# Patient Record
Sex: FEMALE | Race: WHITE | NOT HISPANIC OR LATINO | Employment: OTHER | URBAN - METROPOLITAN AREA
[De-identification: names, ages, dates, MRNs, and addresses within clinical notes are randomized per-mention and may not be internally consistent; named-entity substitution may affect disease eponyms.]

---

## 2017-07-03 ENCOUNTER — HOSPITAL ENCOUNTER (OUTPATIENT)
Facility: HOSPITAL | Age: 52
Discharge: HOME | End: 2017-07-04
Attending: HOSPITALIST | Admitting: HOSPITALIST
Payer: MEDICARE

## 2017-07-03 DIAGNOSIS — G92.8 DRUG-INDUCED ENCEPHALOPATHY: Primary | ICD-10-CM

## 2017-07-03 DIAGNOSIS — G93.40 ACUTE ENCEPHALOPATHY: ICD-10-CM

## 2017-07-03 PROBLEM — F10.10 ALCOHOL ABUSE: Status: ACTIVE | Noted: 2017-07-03

## 2017-07-03 PROBLEM — F11.20 OPIATE DEPENDENCE: Status: ACTIVE | Noted: 2017-07-03

## 2017-07-03 LAB
CHOLEST/HDLC SERPL: 2.9 {RATIO}
FOLATE SERPL-MCNC: 14.9 NG/ML
HDL/CHOLESTEROL RATIO: 34.4 %
HDLC SERPL-MCNC: 183 MG/DL
HDLC SERPL-MCNC: 63 MG/DL
LDLC SERPL CALC-MCNC: 85.8 MG/DL
NONHDLC SERPL-MCNC: 120 MG/DL
TRIGL SERPL-MCNC: 171 MG/DL
TSH SERPL DL<=0.005 MIU/L-ACNC: 1.74 UIU/ML
VIT B12 SERPL-MCNC: 197 PG/ML

## 2017-07-03 PROCEDURE — G0378 HOSPITAL OBSERVATION PER HR: HCPCS

## 2017-07-03 PROCEDURE — 63600175 PHARM REV CODE 636 W HCPCS: Performed by: HOSPITALIST

## 2017-07-03 PROCEDURE — 86703 HIV-1/HIV-2 1 RESULT ANTBDY: CPT

## 2017-07-03 PROCEDURE — 36415 COLL VENOUS BLD VENIPUNCTURE: CPT

## 2017-07-03 PROCEDURE — 83036 HEMOGLOBIN GLYCOSYLATED A1C: CPT

## 2017-07-03 PROCEDURE — 82607 VITAMIN B-12: CPT

## 2017-07-03 PROCEDURE — G0379 DIRECT REFER HOSPITAL OBSERV: HCPCS

## 2017-07-03 PROCEDURE — 80061 LIPID PANEL: CPT

## 2017-07-03 PROCEDURE — 84443 ASSAY THYROID STIM HORMONE: CPT

## 2017-07-03 PROCEDURE — 86592 SYPHILIS TEST NON-TREP QUAL: CPT

## 2017-07-03 PROCEDURE — 82746 ASSAY OF FOLIC ACID SERUM: CPT

## 2017-07-03 RX ORDER — TRAMADOL HYDROCHLORIDE 50 MG/1
50 TABLET ORAL EVERY 6 HOURS PRN
Status: DISCONTINUED | OUTPATIENT
Start: 2017-07-03 | End: 2017-07-04

## 2017-07-03 RX ORDER — HALOPERIDOL 5 MG/ML
5 INJECTION INTRAMUSCULAR EVERY 8 HOURS PRN
Status: DISCONTINUED | OUTPATIENT
Start: 2017-07-03 | End: 2017-07-04 | Stop reason: HOSPADM

## 2017-07-03 RX ORDER — ENOXAPARIN SODIUM 100 MG/ML
40 INJECTION SUBCUTANEOUS EVERY 24 HOURS
Status: DISCONTINUED | OUTPATIENT
Start: 2017-07-03 | End: 2017-07-04 | Stop reason: HOSPADM

## 2017-07-03 RX ORDER — ACETAMINOPHEN 325 MG/1
650 TABLET ORAL EVERY 6 HOURS PRN
Status: DISCONTINUED | OUTPATIENT
Start: 2017-07-03 | End: 2017-07-04 | Stop reason: HOSPADM

## 2017-07-03 RX ADMIN — ENOXAPARIN SODIUM 40 MG: 100 INJECTION SUBCUTANEOUS at 05:07

## 2017-07-03 NOTE — CONSULTS
[facility]  Cardiac Electrophysiology  Consult Note    Admission Date: 7/3/2017  Code Status: Full Code   Attending Provider: Alberto Duke MD  Consulting Provider: Murtaza Rodriguez MD  Principal Problem:Acute encephalopathy    Inpatient consult to Neurology  Consult performed by: WILLY HERNANDEZ  Consult ordered by: ALBERTO DUKE        Subjective:     Chief Complaint:  AMS    HPI: 52 yo female with pmhx of sciatica and RLS was transferred from [facility] for acute change in mental status. History was obtained from the  and daughter, as patient is not oriented to time and place and does not answer questions appropriately. She has word salad. She has episodes of word salad followed by responses with one words. She is awake and alert and is protecting her airway. Per  she was doing well till 5 pm yesterday. He came home she had dinner ready for them, they talked normally. She has been going through stress at home for her son is in the hospital and sick and the step daughter is chronically sick as well. 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 She was laying down after having few bears with her . Around 6-7 pm she got up and was unstable and had slurred speech.  brought her to the hospital this morning. She had episodes of word salad and would close her eyes and then wake up again agitated but more responsive. She was recognizing her family members, but when asked about what was going on she did not know. She was not able to answer any other questions or follow commands for physical exam. She used to take Norco as long term for 7 years and had recently been taken out of it. She has a history of psych as once before she had overdosed in her medications when her father passed away.  also mentioned that she used to take muscle relaxers but he cannot find her medication bottle.     PMHx:   Sciatica and RLS    Surgery:   Back surgery (family does not know  the details and if she has any screws or metal in the body).     Review of patient's allergies indicates:  Not on File    No current facility-administered medications on file prior to encounter.      No current outpatient prescriptions on file prior to encounter.     Family History     None        Social History Main Topics    Smoking status: Not on file    Smokeless tobacco: Not on file    Alcohol use Not on file    Drug use: Unknown    Sexual activity: Not on file     Review of Systems   Unable to perform ROS: acuity of condition     Objective:     Vital Signs (Most Recent):  Pulse: 90 (07/03/17 1300)  BP: 137/63 (07/03/17 1300) Vital Signs (24h Range):  Pulse:  [90] 90  BP: (137)/(63) 137/63        There is no height or weight on file to calculate BMI.          Physical Exam   Constitutional: She appears well-developed and well-nourished.   Eyes: Right eye exhibits no discharge. Left eye exhibits no discharge. Right conjunctiva is not injected. Left conjunctiva is not injected. Right eye exhibits no nystagmus. Left eye exhibits no nystagmus. Right pupil is not reactive. Left pupil is not reactive.   Neck: Normal range of motion. Neck supple. No thyromegaly present.   Cardiovascular: Normal rate, regular rhythm, normal heart sounds and intact distal pulses.    Pulmonary/Chest: Effort normal and breath sounds normal. No respiratory distress. She has no wheezes. She has no rales. She exhibits no tenderness.   Abdominal: Soft. Bowel sounds are normal. She exhibits no distension and no mass. There is no tenderness. There is no rebound and no guarding.   Neurological: She is disoriented. She displays abnormal reflex. GCS eye subscore is 4. GCS verbal subscore is 2. GCS motor subscore is 4.   Unable to perform good exam as pt is not following commands   Skin: Skin is warm and dry. She is not diaphoretic.   Nursing note and vitals reviewed.        Assessment and Plan:   50 yo female with no significant pmhx was  admitted for AMS.    AMS:   - likely 2/2 stress vs medical overdose vs TIA vs stroke  - Will recommend MRI of the head and MRA of the brain and neck  - Will also recommend following labs: Vit B12, folate, RPR, HIV, TSH, hemoglobin A1c, lipid panel, cbc, cmp, tox screen, ETOH.   - Will recommend EEG    Thank you for your consult. I will follow-up with patient. Please contact us if you have any additional questions.    Teresa Erazo MD  Cardiac Electrophysiology  [facility]

## 2017-07-03 NOTE — SUBJECTIVE & OBJECTIVE
Past Medical History:   Diagnosis Date    Depression     Opiate overdose     Osteoarthritis of spine with radiculopathy, lumbar region        Past Surgical History:   Procedure Laterality Date    SPINE SURGERY  2010    with hardware       Review of patient's allergies indicates:  Not on File    No current facility-administered medications on file prior to encounter.      No current outpatient prescriptions on file prior to encounter.     Family History     Family history is unknown by patient.        Social History Main Topics    Smoking status: Former Smoker    Smokeless tobacco: Not on file    Alcohol use Yes    Drug use: Unknown    Sexual activity: Not on file     Review of Systems   Unable to perform ROS: Mental status change     Objective:     Vital Signs (Most Recent):  Pulse: 90 (07/03/17 1300)  BP: 137/63 (07/03/17 1300) Vital Signs (24h Range):  Pulse:  [90] 90  BP: (137)/(63) 137/63        There is no height or weight on file to calculate BMI.    Physical Exam   Constitutional: She appears well-developed and well-nourished. No distress.   HENT:   Head: Normocephalic and atraumatic.   Eyes: Conjunctivae are normal. Pupils are equal, round, and reactive to light.   Neck: Neck supple. No tracheal deviation present.   Cardiovascular: Normal rate, regular rhythm and normal heart sounds.    Pulmonary/Chest: Effort normal and breath sounds normal. No respiratory distress.   Abdominal: Soft. She exhibits no distension. Bowel sounds are decreased. There is no tenderness.   Musculoskeletal: She exhibits no edema or deformity.   Neurological: She is alert. She displays no atrophy and no tremor. A sensory deficit is present. She displays no seizure activity.   Subjective numbness both sides of face, moves all extremities but not fully cooperative with exam   Skin: Skin is warm and dry.   Psychiatric: Her affect is blunt. Her speech is slurred. She is withdrawn.   Nursing note and vitals reviewed.        Significant Labs: All pertinent labs within the past 24 hours have been reviewed. Labs at [facility] were unremarkable.    Significant Imaging: I have reviewed all pertinent imaging results/findings within the past 24 hours. Head CT at [facility] showed motion artifact but otherwise was unremarkable.

## 2017-07-03 NOTE — ASSESSMENT & PLAN NOTE
Opiate dependence  Unknown what she was taking before but all her medications were stopped by Dr. Ford.  Give PRN acetaminophen and tramadol.

## 2017-07-03 NOTE — PROGRESS NOTES
[facility] Hospital Medicine  Progress Note    Patient Name: Kymberly Mckinley  MRN: [id number]  Patient Class: OP- Observation   Admission Date: 7/3/2017  Length of Stay: 0 days  Attending Physician: Alberto Duke MD  Primary Care Provider: No primary care provider on file.        Subjective:     Principal Problem:Drug-induced encephalopathy    HPI:  Kymberly Mckinley is a 51 y.o. white woman with obesity, lumbar spondylosis with radiculopathy status post spinal surgery, depression, alcohol abuse (drinks about 5 beers a day), opiate dependence with history of overdose, and depression.  She lives in [place], [place].  She is originally from [place].  She is .  Her pain management specialist is Dr. Keenan Ford.    She developed acute confusion, incoherent speech, and generalized muscle weakness around 17:30 on 7/2/17 while with her  at home.  She had been on medications for chronic pain including opiates and pregabalin, but Dr. Ford stopped prescribing her medications and she was nearly out of her medications.  Her  takes opiates and muscle relaxants and could not find his bottle of muscle relaxants.  She was taken to [facility].  She was combative and required soft wrist and ankle restraints.  Drug screen was negative.  Head CT had motion artifact.  Due to lack of Neurology, she was transferred to [facility].     Hospital Course:  No notes on file    Past Medical History:   Diagnosis Date    Depression     Opiate overdose     Osteoarthritis of spine with radiculopathy, lumbar region        Past Surgical History:   Procedure Laterality Date    SPINE SURGERY  2010    with hardware       Review of patient's allergies indicates:  Not on File    No current facility-administered medications on file prior to encounter.      No current outpatient prescriptions on file prior to encounter.     Family History     Family history is  unknown by patient.        Social History Main Topics    Smoking status: Former Smoker    Smokeless tobacco: Not on file    Alcohol use Yes    Drug use: Unknown    Sexual activity: Not on file     Review of Systems   Unable to perform ROS: Mental status change     Objective:     Vital Signs (Most Recent):  Pulse: 90 (07/03/17 1300)  BP: 137/63 (07/03/17 1300) Vital Signs (24h Range):  Pulse:  [90] 90  BP: (137)/(63) 137/63        There is no height or weight on file to calculate BMI.    Physical Exam   Constitutional: She appears well-developed and well-nourished. No distress.   HENT:   Head: Normocephalic and atraumatic.   Eyes: Conjunctivae are normal. Pupils are equal, round, and reactive to light.   Neck: Neck supple. No tracheal deviation present.   Cardiovascular: Normal rate, regular rhythm and normal heart sounds.    Pulmonary/Chest: Effort normal and breath sounds normal. No respiratory distress.   Abdominal: Soft. She exhibits no distension. Bowel sounds are decreased. There is no tenderness.   Musculoskeletal: She exhibits no edema or deformity.   Neurological: She is alert. She displays no atrophy and no tremor. A sensory deficit is present. She displays no seizure activity.   Subjective numbness both sides of face, moves all extremities but not fully cooperative with exam   Skin: Skin is warm and dry.   Psychiatric: Her affect is blunt. Her speech is slurred. She is withdrawn.   Nursing note and vitals reviewed.       Significant Labs: All pertinent labs within the past 24 hours have been reviewed. Labs at [facility] were unremarkable.    Significant Imaging: I have reviewed all pertinent imaging results/findings within the past 24 hours. Head CT at [facility] showed motion artifact but otherwise was unremarkable.     Assessment/Plan:      * Drug-induced encephalopathy    Likely took her 's opiates and/or muscle relaxants, which would have then interacted  with the large amount of beer she normally drinks.  Appreciate Neurology.  For now, will see if mentation improves given time to metabolize alcohol and medications.  Continue neuro checks, fall precautions.          Osteoarthritis of spine with radiculopathy, lumbar region    Opiate dependence  Unknown what she was taking before but all her medications were stopped by Dr. Ford.  Give PRN acetaminophen and tramadol.          Alcohol abuse    Monitor for withdrawal.            VTE Risk Mitigation         Ordered     enoxaparin injection 40 mg  Daily     Route:  Subcutaneous        07/03/17 1207     Medium Risk of VTE  Once      07/03/17 1207        Disposition: If mentation improves with time, likely due to alcohol/drug interactions, and can be discharged home with something to treat her chronic pain.    Alberto Duke MD  Department of Hospital Medicine   [facility]

## 2017-07-03 NOTE — ASSESSMENT & PLAN NOTE
Took her 's baclofen, which would have then interacted with the large amount of beer she normally drinks.  Appreciate Neurology.  No further evaluation needed given her return to baseline.

## 2017-07-03 NOTE — PLAN OF CARE
Problem: Patient Care Overview  Goal: Plan of Care Review  Pt is disoriented but more alert than at time of admit. Pt disoriented to time, situation, and place. Pt reports some back pain which we are relieving by resituating the pt. Restraints are needed because pt is confused and swung at EMS and she is pulling at medical devices. Fall precautions maintained. Will continue to monitor.

## 2017-07-03 NOTE — HPI
Kymberly Mckinley is a 51 y.o. white woman with obesity, lumbar spondylosis with radiculopathy status post spinal surgery, depression, alcohol abuse (drinks about 5 beers a day), opiate dependence with history of overdose, and depression.  She lives in [place], [place].  She is originally from [place].  She is .  Her pain management specialist is Dr. Keenan Ford.    She developed acute confusion, incoherent speech, and generalized muscle weakness around 17:30 on 7/2/17 while with her  at home.  She had been on medications for chronic pain including opiates and pregabalin, but Dr. Ford stopped prescribing her medications and she was nearly out of her medications.  Her son has been in the ICU for a thyroid complication and she had significant stress.  Her  takes opiates and muscle relaxants and could not find his bottle of muscle relaxants.  She was taken to [facility].  She was combative and required soft wrist and ankle restraints.  Drug screen was negative.  Head CT had motion artifact.  Due to lack of Neurology, she was transferred to [facility].

## 2017-07-04 VITALS
HEIGHT: 66 IN | TEMPERATURE: 99 F | HEART RATE: 90 BPM | DIASTOLIC BLOOD PRESSURE: 81 MMHG | SYSTOLIC BLOOD PRESSURE: 135 MMHG | RESPIRATION RATE: 20 BRPM | BODY MASS INDEX: 36.14 KG/M2 | WEIGHT: 224.88 LBS

## 2017-07-04 PROBLEM — F10.10 ALCOHOL ABUSE, DAILY USE: Chronic | Status: ACTIVE | Noted: 2017-07-03

## 2017-07-04 PROBLEM — E53.8 VITAMIN B12 DEFICIENCY: Status: ACTIVE | Noted: 2017-07-04

## 2017-07-04 LAB
ESTIMATED AVG GLUCOSE: 91 MG/DL
HBA1C MFR BLD HPLC: 4.8 %
RPR SER QL: NORMAL

## 2017-07-04 PROCEDURE — G0378 HOSPITAL OBSERVATION PER HR: HCPCS

## 2017-07-04 PROCEDURE — 63600175 PHARM REV CODE 636 W HCPCS: Performed by: HOSPITALIST

## 2017-07-04 RX ORDER — CYANOCOBALAMIN 1000 UG/ML
1000 INJECTION, SOLUTION INTRAMUSCULAR; SUBCUTANEOUS DAILY
Status: DISCONTINUED | OUTPATIENT
Start: 2017-07-04 | End: 2017-07-04 | Stop reason: HOSPADM

## 2017-07-04 RX ORDER — CYANOCOBALAMIN 1000 UG/ML
INJECTION, SOLUTION INTRAMUSCULAR; SUBCUTANEOUS
Qty: 10 ML | Refills: 3 | Status: SHIPPED | OUTPATIENT
Start: 2017-07-04 | End: 2022-05-13

## 2017-07-04 RX ADMIN — CYANOCOBALAMIN 1000 MCG: 1000 INJECTION, SOLUTION INTRAMUSCULAR; SUBCUTANEOUS at 09:07

## 2017-07-04 NOTE — HOSPITAL COURSE
Muscle relaxant overdose worsened by her daily alcohol abuse was suspected.  Neurology recommended extensive workup.  She was found to have vitamin B12 deficiency.  The next morning her mentation improved and she confirmed the suspicion by reporting that she had taken the bottle of baclofen that her  could not find, and had taken several baclofen.        Daily Note:  Back to baseline today per . Does not want to undergo any MRIs or EEGs.

## 2017-07-04 NOTE — PLAN OF CARE
Problem: Patient Care Overview  Goal: Plan of Care Review  Outcome: Ongoing (interventions implemented as appropriate)  Pt stable. NO distress noted. POC reviewed with pt. Pt verbalized understanding. Pt remains SR on the monitor. NO true red alarms noted. Pt c/o back and vaginal pain. Rosales removed and warm compress applied to lower back. Soft non violent restraints in place to bilateral wrist. Fall precautions maintained. Bed in lowest position, call light in reach and bed alarm on.

## 2017-07-04 NOTE — H&P
[facility] Hospital Medicine  History & Physical    Patient Name: Kymberly Mckinley  MRN: [id number]  Admission Date: 7/3/2017  Attending Physician: Alberto Duke MD   Primary Care Provider: No primary care provider on file.         Patient information was obtained from spouse/SO and relative(s).     Subjective:     Principal Problem:Drug-induced encephalopathy    Chief Complaint: Transfer from [facility] for Neurology     HPI: Kymberly Mckinley is a 51 y.o. white woman with obesity, lumbar spondylosis with radiculopathy status post spinal surgery, depression, alcohol abuse (drinks about 5 beers a day), opiate dependence with history of overdose, and depression.  She lives in [place], [place].  She is originally from [place].  She is .  Her pain management specialist is Dr. Keenan Ford.    She developed acute confusion, incoherent speech, and generalized muscle weakness around 17:30 on 7/2/17 while with her  at home.  She had been on medications for chronic pain including opiates and pregabalin, but Dr. Ford stopped prescribing her medications and she was nearly out of her medications.  Her son has been in the ICU for a thyroid complication and she had under stress.  Her  takes opiates and muscle relaxants and could not find his bottle of muscle relaxants.  She was taken to [facility].  She was combative and required soft wrist and ankle restraints.  Drug screen was negative.  Head CT had motion artifact.  Due to lack of Neurology, she was transferred to [facility].     Past Medical History:   Diagnosis Date    Depression     Opiate overdose     Osteoarthritis of spine with radiculopathy, lumbar region        Past Surgical History:   Procedure Laterality Date    SPINE SURGERY  2010    with hardware       Review of patient's allergies indicates:  Not on File    No current facility-administered medications on  file prior to encounter.      No current outpatient prescriptions on file prior to encounter.     Family History     Family history is unknown by patient.        Social History Main Topics    Smoking status: Former Smoker    Smokeless tobacco: Not on file    Alcohol use Yes    Drug use: Unknown    Sexual activity: Not on file     Review of Systems   Unable to perform ROS: Mental status change     Objective:     Vital Signs (Most Recent):  Temp: 98.2 °F (36.8 °C) (07/03/17 1300)  Pulse: 90 (07/03/17 1300)  Resp: 18 (07/03/17 1300)  BP: 137/63 (07/03/17 1300) Vital Signs (24h Range):  Temp:  [98.2 °F (36.8 °C)] 98.2 °F (36.8 °C)  Pulse:  [90] 90  Resp:  [18] 18  BP: (137)/(63) 137/63     Weight: 102 kg (224 lb 13.9 oz)  Body mass index is 36.29 kg/m².    Physical Exam   Constitutional: She appears well-developed and well-nourished. No distress.   HENT:   Head: Normocephalic and atraumatic.   Eyes: Conjunctivae are normal. Pupils are equal, round, and reactive to light.   Neck: Neck supple. No tracheal deviation present.   Cardiovascular: Normal rate, regular rhythm and normal heart sounds.    Pulmonary/Chest: Effort normal and breath sounds normal. No respiratory distress.   Abdominal: Soft. She exhibits no distension. Bowel sounds are decreased. There is no tenderness.   Musculoskeletal: She exhibits no edema or deformity.   Neurological: She is alert. She displays no atrophy and no tremor. A sensory deficit is present. She displays no seizure activity.   Subjective numbness both sides of face, moves all extremities but not fully cooperative with exam   Skin: Skin is warm and dry.   Psychiatric: Her affect is blunt. Her speech is slurred. She is withdrawn.   Nursing note and vitals reviewed.       Significant Labs: All pertinent labs within the past 24 hours have been reviewed. Labs at [facility] were unremarkable.    Significant Imaging: I have reviewed all pertinent imaging results/findings  within the past 24 hours. Head CT at [facility] showed motion artifact but otherwise was unremarkable.     Assessment/Plan:     * Drug-induced encephalopathy    Likely took her 's opiates and/or muscle relaxants, which would have then interacted with the large amount of beer she normally drinks.  Appreciate Neurology.  For now, will see if mentation improves given time to metabolize alcohol and medications.  Continue neuro checks, fall precautions.          Osteoarthritis of spine with radiculopathy, lumbar region    Opiate dependence  Unknown what she was taking before but all her medications were stopped by Dr. Ford.  Give PRN acetaminophen and tramadol.          Alcohol abuse    Monitor for withdrawal.            VTE Risk Mitigation         Ordered     enoxaparin injection 40 mg  Daily     Route:  Subcutaneous        07/03/17 1207     Medium Risk of VTE  Once      07/03/17 1207        Alberto Duke MD  Department of Hospital Medicine   [facility]

## 2017-07-04 NOTE — SUBJECTIVE & OBJECTIVE
Past Medical History:   Diagnosis Date    Depression     Opiate overdose     Osteoarthritis of spine with radiculopathy, lumbar region        Past Surgical History:   Procedure Laterality Date    SPINE SURGERY  2010    with hardware       Review of patient's allergies indicates:  Not on File    No current facility-administered medications on file prior to encounter.      No current outpatient prescriptions on file prior to encounter.     Family History     Family history is unknown by patient.        Social History Main Topics    Smoking status: Former Smoker    Smokeless tobacco: Not on file    Alcohol use Yes    Drug use: Unknown    Sexual activity: Not on file     Review of Systems   Unable to perform ROS: Mental status change     Objective:     Vital Signs (Most Recent):  Temp: 98.2 °F (36.8 °C) (07/03/17 1300)  Pulse: 90 (07/03/17 1300)  Resp: 18 (07/03/17 1300)  BP: 137/63 (07/03/17 1300) Vital Signs (24h Range):  Temp:  [98.2 °F (36.8 °C)] 98.2 °F (36.8 °C)  Pulse:  [90] 90  Resp:  [18] 18  BP: (137)/(63) 137/63     Weight: 102 kg (224 lb 13.9 oz)  Body mass index is 36.29 kg/m².    Physical Exam   Constitutional: She appears well-developed and well-nourished. No distress.   HENT:   Head: Normocephalic and atraumatic.   Eyes: Conjunctivae are normal. Pupils are equal, round, and reactive to light.   Neck: Neck supple. No tracheal deviation present.   Cardiovascular: Normal rate, regular rhythm and normal heart sounds.    Pulmonary/Chest: Effort normal and breath sounds normal. No respiratory distress.   Abdominal: Soft. She exhibits no distension. Bowel sounds are decreased. There is no tenderness.   Musculoskeletal: She exhibits no edema or deformity.   Neurological: She is alert. She displays no atrophy and no tremor. A sensory deficit is present. She displays no seizure activity.   Subjective numbness both sides of face, moves all extremities but not fully cooperative with exam   Skin: Skin is  warm and dry.   Psychiatric: Her affect is blunt. Her speech is slurred. She is withdrawn.   Nursing note and vitals reviewed.       Significant Labs: All pertinent labs within the past 24 hours have been reviewed. Labs at [facility] were unremarkable.    Significant Imaging: I have reviewed all pertinent imaging results/findings within the past 24 hours. Head CT at [facility] showed motion artifact but otherwise was unremarkable.

## 2017-07-04 NOTE — PLAN OF CARE
Family was assured on admission that patient probably just took the muscle relaxants that her  could not find.  She confirmed this suspicion this morning and overdose is resolved.  Cancel the rest of Neurology's recommended stroke workup.  Patient will be discharged home.

## 2017-07-04 NOTE — PROGRESS NOTES
"[facility] Progress Note    Subjective  Patient is awake, alert, and oriented to person, place, time, and situation    Patient's mental status improved greatly today versus yesterday.  Patient reports that she remembers several details about the events of the day she was brought in to the hospital, and recounted these details to the team.      Patient at first denied having taken any medication preceding her admission except for beer, but patient subsequently recounted that, because her  typically only gives her one baclofen per day, she found the baclofen bottle and hid it from him and took "three" baclofen that day in addition to the beer she drank.    Past Medical History -   Past Medical History:   Diagnosis Date    Depression     Opiate overdose     Osteoarthritis of spine with radiculopathy, lumbar region        Social History -   Social History     Social History    Marital status:      Spouse name: N/A    Number of children: N/A    Years of education: N/A     Social History Main Topics    Smoking status: Former Smoker    Smokeless tobacco: None    Alcohol use Yes    Drug use: Unknown    Sexual activity: Not Asked     Other Topics Concern    None     Social History Narrative    None       Family History -   Family History   Problem Relation Age of Onset    Family history unknown: Yes       Allergies - NKDA  Medications -   No current facility-administered medications on file prior to encounter.      No current outpatient prescriptions on file prior to encounter.         Vitals -     Vitals:    07/04/17 0305 07/04/17 0440 07/04/17 0505 07/04/17 0700   BP:  137/63  (!) 142/61   BP Location:  Left arm  Left arm   Patient Position:  Lying  Lying   BP Method:  Automatic  Automatic   Pulse: 86 85 78 80   Resp:  20  20   Temp:  98.3 °F (36.8 °C)  98.4 °F (36.9 °C)   TempSrc:  Oral  Oral   Weight:       Height:           Neurological Exam -     General appearance: Well nourished, well " developed, no acute distress.  Facial Expression: normal       Affect: full       Orientation to time & place:  Oriented to time, place, person and situation       Attention & concentration:  Normal attention span and concentration       Memory:  Recent and remote memory intact  Language: Spontaneous, fluent; able to repeat and name objects        Fund of knowledge:  Aware of current events        Speech:  normal (not dysarthric)    Cranial nerves: normal visual acuity, visual fields full, pupils equal round and reactive, extraocular movements intact, facial sensation intact, face symmetrical, hearing intact to whisper, palate raises midline, shoulder shrug strength normal, tongue protrudes midline.    Musculoskeletal:  Muscle tone: all 4 extremities normal        Muscle Bulk: all 4 extremities normal        Muscle strength:  5/5 in bilateral upper and lower extremities in proximal and distal flexion and extension       No pronator drift  Sensation: Intact to light touch, pin prick, vibration in all extremities         Deep tendon Reflexes: 2+ bilateral triceps, biceps, and brachioradialis; 2+ in bilateral patellae. Positive ankle jerks.  Plantar flexor responses bilaterally    Cerebellar and Coordination:  Gait:  deferred     Finger-nose: no dysmetria       Rapid Alternating Movements (pronation/supination):  R normal; L normal    Labs -      Recent Labs  Lab 07/03/17  1940   TSH 1.736   [id number] 197*   FOLATE 14.9         Assessment and Plan -   51 year old woman who presented for altered mental status  -After the patient's recount of the events that brought her into the hospital coupled with her physical exam yesterday and rapid improvement after the initial evaluation and then today, it seems nearly certain that this presentation was 2/2 medication ingestion.  -Patient significantly improved today; awake, alert, and oriented    We will sign off for now.  Thank you for the consult.  Please contact us if we can  be of further assistance.    Discussed with Dr. Jennifer Israel MD  [facility]

## 2017-07-05 LAB — HIV 1+2 AB+HIV1 P24 AG SERPL QL IA: NEGATIVE

## 2022-01-14 NOTE — PLAN OF CARE
D/c orders received, pt has no HH or DME needs.   Pt AA0x 3, independent with adl's.     Scar Spouse: [phone] available to  patient in discharge.           ED SIGNOUT  Date/Time:1/14/2022 12:01 AM    Patient signed out to me by my colleague, Dr. Saunders.  Please see their note for complete history and physical. Plan to follow up on labs and CT.      The creation of this record is based on the scribe s observations of the work being performed by Dr. Simon and the provider s statements to them. It was created on their behalf by Cesar Emerson a trained medical scribe. This document has been checked and approved by the attending provider.      REMAINING ED WORKUP:    Brief HPI:  Patient reports he has been recovering from COVID-19 recently and has continued to have a cough and fever despite testing negative. Patient also had onset of worsening epigastric pain a few days ago which he states is consistent with his PSC. He was seen by his PCP earlier today and was referred to this ED after his WBC was elevated and he measured a fever. Patient notes that he did have ERCP done for his PSC at [facility] in October of last year.    Vitals:  BP (!) 146/86   Pulse 99   Temp 99.6  F (37.6  C) (Oral)   Resp 23   Wt 100.7 kg (222 lb)   SpO2 97%   BMI 28.50 kg/m        Pertinent labs results reviewed   Results for orders placed or performed during the hospital encounter of 01/13/22   CT Abdomen Pelvis w Contrast    Impression    IMPRESSION:   1.  Changes in the liver and bile ducts compatible with patient's known PSC as detailed above.    2.  Indeterminate poorly marginated prominent geographic area of heterogeneous decreased attenuation in the left hepatic lobe superiorly. This is new compared to prior study and indeterminate. Given the patient's history of PSC, an underlying   infiltrative neoplasm cannot be excluded. However, benign etiologies would also be in the differential including some heterogeneous asymmetric fatty infiltration or infection. Given these findings and the patient's history, MRI of the liver is   recommended for further evaluation.    3.  A few mildly prominent  emmanuelle hepatis lymph nodes are nonspecific and unchanged. There are however a couple indeterminate lymph nodes in the lower anterior mediastinum which have shown some increase in size over time.    4.  Postoperative changes of colectomy with ileoanal anastomosis. No evidence for bowel obstruction or acute bowel findings.   Extra Blue Top Tube   Result Value Ref Range    Hold Specimen JIC    Extra Red Top Tube   Result Value Ref Range    Hold Specimen JIC    Extra Green Top (Lithium Heparin) Tube   Result Value Ref Range    Hold Specimen JIC    Extra Purple Top Tube   Result Value Ref Range    Hold Specimen JIC    Extra Green Top (Lithium Heparin) ON ICE   Result Value Ref Range    Hold Specimen JIC    Comprehensive metabolic panel   Result Value Ref Range    Sodium 139 136 - 145 mmol/L    Potassium 3.7 3.5 - 5.0 mmol/L    Chloride 104 98 - 107 mmol/L    Carbon Dioxide (CO2) 25 22 - 31 mmol/L    Anion Gap 10 5 - 18 mmol/L    Urea Nitrogen 7 (L) 8 - 22 mg/dL    Creatinine 0.82 0.70 - 1.30 mg/dL    Calcium 9.8 8.5 - 10.5 mg/dL    Glucose 134 (H) 70 - 125 mg/dL    Alkaline Phosphatase 170 (H) 45 - 120 U/L    AST 27 0 - 40 U/L    ALT 34 0 - 45 U/L    Protein Total 8.9 (H) 6.0 - 8.0 g/dL    Albumin 3.5 3.5 - 5.0 g/dL    Bilirubin Total 0.5 0.0 - 1.0 mg/dL    GFR Estimate >90 >60 mL/min/1.73m2   CBC with platelets and differential   Result Value Ref Range    WBC Count 13.6 (H) 4.0 - 11.0 10e3/uL    RBC Count 5.47 4.40 - 5.90 10e6/uL    Hemoglobin 15.6 13.3 - 17.7 g/dL    Hematocrit 46.9 40.0 - 53.0 %    MCV 86 78 - 100 fL    MCH 28.5 26.5 - 33.0 pg    MCHC 33.3 31.5 - 36.5 g/dL    RDW 12.0 10.0 - 15.0 %    Platelet Count 477 (H) 150 - 450 10e3/uL    % Neutrophils 60 %    % Lymphocytes 28 %    % Monocytes 10 %    % Eosinophils 1 %    % Basophils 1 %    % Immature Granulocytes 0 %    NRBCs per 100 WBC 0 <1 /100    Absolute Neutrophils 8.3 1.6 - 8.3 10e3/uL    Absolute Lymphocytes 3.8 0.8 - 5.3 10e3/uL    Absolute Monocytes  1.3 0.0 - 1.3 10e3/uL    Absolute Eosinophils 0.1 0.0 - 0.7 10e3/uL    Absolute Basophils 0.1 0.0 - 0.2 10e3/uL    Absolute Immature Granulocytes 0.0 <=0.4 10e3/uL    Absolute NRBCs 0.0 10e3/uL   Result Value Ref Range    Lipase 30 0 - 52 U/L   Symptomatic; Unknown Influenza A/B & SARS-CoV2 (COVID-19) Virus PCR Multiplex Nasopharyngeal    Specimen: Nasopharyngeal; Swab   Result Value Ref Range    Influenza A PCR Negative Negative    Influenza B PCR Negative Negative    SARS CoV2 PCR Positive (A) Negative   Troponin I (now)   Result Value Ref Range    Troponin I <0.01 0.00 - 0.29 ng/mL       Pertinent imaging reviewed   Please see official radiology report.  CT Abdomen Pelvis w Contrast   Final Result   IMPRESSION:    1.  Changes in the liver and bile ducts compatible with patient's known PSC as detailed above.      2.  Indeterminate poorly marginated prominent geographic area of heterogeneous decreased attenuation in the left hepatic lobe superiorly. This is new compared to prior study and indeterminate. Given the patient's history of PSC, an underlying    infiltrative neoplasm cannot be excluded. However, benign etiologies would also be in the differential including some heterogeneous asymmetric fatty infiltration or infection. Given these findings and the patient's history, MRI of the liver is    recommended for further evaluation.      3.  A few mildly prominent emmanuelle hepatis lymph nodes are nonspecific and unchanged. There are however a couple indeterminate lymph nodes in the lower anterior mediastinum which have shown some increase in size over time.      4.  Postoperative changes of colectomy with ileoanal anastomosis. No evidence for bowel obstruction or acute bowel findings.           Interventions  Medications   ketorolac (TORADOL) injection 15 mg (has no administration in time range)   iopamidol (ISOVUE-370) solution 100 mL (100 mLs Intravenous Given 1/13/22 7335)        ED Course/MDM:  12:01 AM Signout  accepted from Dr. Saunders.  Prior records were reviewed.  Diagnostics from this visit are reviewed.  12:59 AM  I introduced myself to the patient.    CT scan does not show any signs of biliary obstruction. Bilirubin is normal. White count slightly elevated. There is an area of low-attenuation in the left hepatic lobe of unclear significance. I do not think this is because of his symptoms but does need follow-up as he has had a high risk of hepatic cancer in the setting of PSC. This was discussed with the patient. He will follow-up with his gastroenterologist to get MRI in the future. I think his symptoms are due to COVID. No signs of cardiac disease. Given IVToradol with improvement of his symptoms. Patient will return for any worsening symptoms. Discharged home. Oxygen saturation normal.    ED Course as of 01/14/22 0100   Fri Jan 14, 2022   0057 CT Abdomen Pelvis w Contrast           1. Epigastric pain    2. Infection due to 2019 novel coronavirus    3. PSC (primary sclerosing cholangitis)          Dr. Simon  [facility] Emergency Department   January 14, 2022        Ferdinand Simon MD  01/14/22 0100

## 2022-05-13 ENCOUNTER — LAB VISIT (OUTPATIENT)
Dept: LAB | Facility: HOSPITAL | Age: 57
End: 2022-05-13
Attending: INTERNAL MEDICINE
Payer: MEDICARE

## 2022-05-13 DIAGNOSIS — E55.9 VITAMIN D DEFICIENCY: ICD-10-CM

## 2022-05-13 DIAGNOSIS — E53.8 VITAMIN B12 DEFICIENCY: ICD-10-CM

## 2022-05-13 DIAGNOSIS — R53.82 CHRONIC FATIGUE, UNSPECIFIED: ICD-10-CM

## 2022-05-13 DIAGNOSIS — M47.26 OSTEOARTHRITIS OF SPINE WITH RADICULOPATHY, LUMBAR REGION: ICD-10-CM

## 2022-05-13 DIAGNOSIS — Z11.59 NEED FOR HEPATITIS C SCREENING TEST: ICD-10-CM

## 2022-05-13 DIAGNOSIS — K12.1 MOUTH ULCERS: ICD-10-CM

## 2022-05-13 DIAGNOSIS — E78.5 HYPERLIPIDEMIA, UNSPECIFIED HYPERLIPIDEMIA TYPE: ICD-10-CM

## 2022-05-13 DIAGNOSIS — R73.9 HYPERGLYCEMIA: ICD-10-CM

## 2022-05-13 PROBLEM — M62.838 MUSCLE SPASM: Status: ACTIVE | Noted: 2022-05-13

## 2022-05-13 PROBLEM — G47.00 INSOMNIA: Status: ACTIVE | Noted: 2022-05-13

## 2022-05-13 PROBLEM — G89.29 CHRONIC PAIN: Status: ACTIVE | Noted: 2022-05-13

## 2022-05-13 PROBLEM — Z00.00 WELLNESS EXAMINATION: Status: ACTIVE | Noted: 2022-05-13

## 2022-05-13 LAB
25(OH)D3+25(OH)D2 SERPL-MCNC: 19 NG/ML (ref 30–96)
ALBUMIN SERPL BCP-MCNC: 4.1 G/DL (ref 3.5–5.2)
ALP SERPL-CCNC: 82 U/L (ref 55–135)
ALT SERPL W/O P-5'-P-CCNC: 29 U/L (ref 10–44)
ANION GAP SERPL CALC-SCNC: 8 MMOL/L (ref 8–16)
AST SERPL-CCNC: 28 U/L (ref 10–40)
BASOPHILS # BLD AUTO: 0.04 K/UL (ref 0–0.2)
BASOPHILS NFR BLD: 0.9 % (ref 0–1.9)
BILIRUB SERPL-MCNC: 0.3 MG/DL (ref 0.1–1)
BUN SERPL-MCNC: 24 MG/DL (ref 6–20)
CALCIUM SERPL-MCNC: 8.9 MG/DL (ref 8.7–10.5)
CHLORIDE SERPL-SCNC: 109 MMOL/L (ref 95–110)
CHOLEST SERPL-MCNC: 230 MG/DL (ref 120–199)
CHOLEST/HDLC SERPL: 5 {RATIO} (ref 2–5)
CO2 SERPL-SCNC: 23 MMOL/L (ref 23–29)
CREAT SERPL-MCNC: 0.5 MG/DL (ref 0.5–1.4)
CRP SERPL-MCNC: 1.23 MG/DL (ref 0–0.75)
DIFFERENTIAL METHOD: ABNORMAL
EOSINOPHIL # BLD AUTO: 0.1 K/UL (ref 0–0.5)
EOSINOPHIL NFR BLD: 2.6 % (ref 0–8)
ERYTHROCYTE [DISTWIDTH] IN BLOOD BY AUTOMATED COUNT: 14.5 % (ref 11.5–14.5)
ERYTHROCYTE [SEDIMENTATION RATE] IN BLOOD: 8 MM/HR (ref 0–20)
EST. GFR  (AFRICAN AMERICAN): >60 ML/MIN/1.73 M^2
EST. GFR  (NON AFRICAN AMERICAN): >60 ML/MIN/1.73 M^2
ESTIMATED AVG GLUCOSE: 105 MG/DL (ref 68–131)
GLUCOSE SERPL-MCNC: 93 MG/DL (ref 70–110)
HBA1C MFR BLD: 5.3 % (ref 4–5.6)
HCT VFR BLD AUTO: 37.3 % (ref 37–48.5)
HDLC SERPL-MCNC: 46 MG/DL (ref 40–75)
HDLC SERPL: 20 % (ref 20–50)
HGB BLD-MCNC: 12.7 G/DL (ref 12–16)
IMM GRANULOCYTES # BLD AUTO: 0.01 K/UL (ref 0–0.04)
IMM GRANULOCYTES NFR BLD AUTO: 0.2 % (ref 0–0.5)
LDLC SERPL CALC-MCNC: 111.8 MG/DL (ref 63–159)
LYMPHOCYTES # BLD AUTO: 1.3 K/UL (ref 1–4.8)
LYMPHOCYTES NFR BLD: 28.5 % (ref 18–48)
MCH RBC QN AUTO: 30.5 PG (ref 27–31)
MCHC RBC AUTO-ENTMCNC: 34 G/DL (ref 32–36)
MCV RBC AUTO: 89 FL (ref 82–98)
MONOCYTES # BLD AUTO: 0.4 K/UL (ref 0.3–1)
MONOCYTES NFR BLD: 9.1 % (ref 4–15)
NEUTROPHILS # BLD AUTO: 2.8 K/UL (ref 1.8–7.7)
NEUTROPHILS NFR BLD: 58.7 % (ref 38–73)
NONHDLC SERPL-MCNC: 184 MG/DL
NRBC BLD-RTO: 0 /100 WBC
PLATELET # BLD AUTO: 268 K/UL (ref 150–450)
PMV BLD AUTO: 8.9 FL (ref 9.2–12.9)
POTASSIUM SERPL-SCNC: 4.2 MMOL/L (ref 3.5–5.1)
PROT SERPL-MCNC: 7.7 G/DL (ref 6–8.4)
RBC # BLD AUTO: 4.17 M/UL (ref 4–5.4)
RHEUMATOID FACT SERPL-ACNC: <10 IU/ML (ref 0–15)
SODIUM SERPL-SCNC: 140 MMOL/L (ref 136–145)
TRIGL SERPL-MCNC: 361 MG/DL (ref 30–150)
TSH SERPL DL<=0.005 MIU/L-ACNC: 3.07 UIU/ML (ref 0.4–4)
VIT B12 SERPL-MCNC: 528 PG/ML (ref 210–950)
WBC # BLD AUTO: 4.7 K/UL (ref 3.9–12.7)

## 2022-05-13 PROCEDURE — 84443 ASSAY THYROID STIM HORMONE: CPT | Performed by: INTERNAL MEDICINE

## 2022-05-13 PROCEDURE — 83036 HEMOGLOBIN GLYCOSYLATED A1C: CPT | Performed by: INTERNAL MEDICINE

## 2022-05-13 PROCEDURE — 86039 ANTINUCLEAR ANTIBODIES (ANA): CPT | Performed by: INTERNAL MEDICINE

## 2022-05-13 PROCEDURE — 85025 COMPLETE CBC W/AUTO DIFF WBC: CPT | Performed by: INTERNAL MEDICINE

## 2022-05-13 PROCEDURE — 86431 RHEUMATOID FACTOR QUANT: CPT | Performed by: INTERNAL MEDICINE

## 2022-05-13 PROCEDURE — 80074 ACUTE HEPATITIS PANEL: CPT | Performed by: INTERNAL MEDICINE

## 2022-05-13 PROCEDURE — 80061 LIPID PANEL: CPT | Performed by: INTERNAL MEDICINE

## 2022-05-13 PROCEDURE — 86671 FUNGUS NES ANTIBODY: CPT | Mod: 91 | Performed by: INTERNAL MEDICINE

## 2022-05-13 PROCEDURE — 36415 COLL VENOUS BLD VENIPUNCTURE: CPT | Performed by: INTERNAL MEDICINE

## 2022-05-13 PROCEDURE — 86038 ANTINUCLEAR ANTIBODIES: CPT | Performed by: INTERNAL MEDICINE

## 2022-05-13 PROCEDURE — 87389 HIV-1 AG W/HIV-1&-2 AB AG IA: CPT | Performed by: INTERNAL MEDICINE

## 2022-05-13 PROCEDURE — 86140 C-REACTIVE PROTEIN: CPT | Performed by: INTERNAL MEDICINE

## 2022-05-13 PROCEDURE — 85651 RBC SED RATE NONAUTOMATED: CPT | Performed by: INTERNAL MEDICINE

## 2022-05-13 PROCEDURE — 82306 VITAMIN D 25 HYDROXY: CPT | Performed by: INTERNAL MEDICINE

## 2022-05-13 PROCEDURE — 82607 VITAMIN B-12: CPT | Performed by: INTERNAL MEDICINE

## 2022-05-13 PROCEDURE — 80053 COMPREHEN METABOLIC PANEL: CPT | Performed by: INTERNAL MEDICINE

## 2022-05-16 LAB
ANA PATTERN 1: NORMAL
ANA SER-ACNC: POSITIVE
ANA TITR SER IF: NORMAL {TITER}

## 2022-05-17 LAB
HAV IGM SERPL QL IA: NEGATIVE
HBV CORE IGM SERPL QL IA: NEGATIVE
HBV SURFACE AG SERPL QL IA: NEGATIVE
HCV AB SERPL QL IA: NEGATIVE
HIV 1+2 AB+HIV1 P24 AG SERPL QL IA: NEGATIVE

## 2022-05-18 LAB
ANCA AB PATTERN SER IF-IMP: NORMAL
ANCA IGG TITR SER IF: NORMAL {TITER}
BAKER'S YEAST IGA QN IA: 5.9 UNITS (ref 0–24.9)
BAKER'S YEAST IGG QN IA: 17.6 UNITS (ref 0–24.9)
PATHOLOGY STUDY: NORMAL
TEST PERFORMANCE INFO SPEC: NORMAL

## 2022-08-15 PROBLEM — Z00.00 WELLNESS EXAMINATION: Status: RESOLVED | Noted: 2022-05-13 | Resolved: 2022-08-15

## 2023-03-08 ENCOUNTER — HOSPITAL ENCOUNTER (OUTPATIENT)
Dept: RADIOLOGY | Facility: HOSPITAL | Age: 58
Discharge: HOME | End: 2023-03-08
Attending: INTERNAL MEDICINE
Payer: MEDICARE

## 2023-03-08 VITALS — BODY MASS INDEX: 37.61 KG/M2 | WEIGHT: 234 LBS | HEIGHT: 66 IN

## 2023-03-08 DIAGNOSIS — Z12.31 SCREENING MAMMOGRAM FOR BREAST CANCER: ICD-10-CM

## 2023-03-08 DIAGNOSIS — Z78.0 POSTMENOPAUSAL: ICD-10-CM

## 2023-03-08 PROCEDURE — 77067 SCR MAMMO BI INCL CAD: CPT | Mod: TC

## 2023-03-08 PROCEDURE — 77080 DXA BONE DENSITY AXIAL: CPT | Mod: TC

## 2023-11-24 RX ORDER — CIPROFLOXACIN 500 MG/1
500 TABLET ORAL EVERY 12 HOURS
Qty: 10 TABLET | Refills: 0 | Status: SHIPPED | OUTPATIENT
Start: 2023-11-24 | End: 2023-11-29

## 2023-12-12 ENCOUNTER — APPOINTMENT (OUTPATIENT)
Dept: LAB | Facility: HOSPITAL | Age: 58
End: 2023-12-12
Payer: MEDICARE

## 2023-12-12 PROBLEM — J06.9 UPPER RESPIRATORY TRACT INFECTION: Status: ACTIVE | Noted: 2023-12-12

## 2023-12-12 PROBLEM — N30.00 ACUTE CYSTITIS WITHOUT HEMATURIA: Status: ACTIVE | Noted: 2023-12-12

## 2024-12-06 ENCOUNTER — HOSPITAL ENCOUNTER (EMERGENCY)
Facility: HOSPITAL | Age: 59
Discharge: HOME | End: 2024-12-06
Attending: INTERNAL MEDICINE
Payer: MEDICARE

## 2024-12-06 VITALS
HEART RATE: 92 BPM | SYSTOLIC BLOOD PRESSURE: 154 MMHG | WEIGHT: 190 LBS | RESPIRATION RATE: 18 BRPM | DIASTOLIC BLOOD PRESSURE: 71 MMHG | TEMPERATURE: 98 F | OXYGEN SATURATION: 97 % | BODY MASS INDEX: 30.53 KG/M2 | HEIGHT: 66 IN

## 2024-12-06 DIAGNOSIS — M94.0 COSTOCHONDRITIS, ACUTE: Primary | ICD-10-CM

## 2024-12-06 PROCEDURE — 96372 THER/PROPH/DIAG INJ SC/IM: CPT | Performed by: NURSE PRACTITIONER

## 2024-12-06 PROCEDURE — 63600175 PHARM REV CODE 636 W HCPCS: Performed by: NURSE PRACTITIONER

## 2024-12-06 PROCEDURE — 99284 EMERGENCY DEPT VISIT MOD MDM: CPT | Mod: 25

## 2024-12-06 RX ORDER — PREDNISONE 20 MG/1
20 TABLET ORAL DAILY
Qty: 5 TABLET | Refills: 0 | Status: SHIPPED | OUTPATIENT
Start: 2024-12-06 | End: 2024-12-11

## 2024-12-06 RX ORDER — DEXAMETHASONE SODIUM PHOSPHATE 4 MG/ML
4 INJECTION, SOLUTION INTRA-ARTICULAR; INTRALESIONAL; INTRAMUSCULAR; INTRAVENOUS; SOFT TISSUE
Status: COMPLETED | OUTPATIENT
Start: 2024-12-06 | End: 2024-12-06

## 2024-12-06 RX ORDER — CYCLOBENZAPRINE HCL 10 MG
10 TABLET ORAL EVERY 8 HOURS PRN
Qty: 12 TABLET | Refills: 0 | Status: SHIPPED | OUTPATIENT
Start: 2024-12-06 | End: 2024-12-10

## 2024-12-06 RX ADMIN — DEXAMETHASONE SODIUM PHOSPHATE 4 MG: 4 INJECTION, SOLUTION INTRA-ARTICULAR; INTRALESIONAL; INTRAMUSCULAR; INTRAVENOUS; SOFT TISSUE at 03:12

## 2024-12-06 NOTE — DISCHARGE INSTRUCTIONS
Use medication as directed.  Avoid heavy lifting and exertional activity, avoid prolonged inactivity.  Plan to follow-up with your primary provider on Monday and return to the emergency department for worsening condition.

## 2024-12-06 NOTE — ED PROVIDER NOTES
Encounter Date: 12/6/2024       History     Chief Complaint   Patient presents with    Rib Injury     Complains of right rib pain x 2 days after falling out of bed. Pain is worse with movement.      This is a 59-year-old white female with multiple medical problems including chronic pain, movement/extrapyramidal movements disorder, and frequent falls who presents to the emergency department with complaints right-sided rib pain which she attributes to a fall she sustained 2 days ago.  Patient reports rolling out of bed and striking right ribcage on door frame.  She reports that since that time she has had intermittent sharp pains, exacerbated by deep breaths and certain movements.  She denies cough, shortness of breath, or any other relative symptoms at this time.  Patient admits to frequent falls.      Review of patient's allergies indicates:   Allergen Reactions    Tizanidine      Other reaction(s): Nausea    Tramadol Hives     Past Medical History:   Diagnosis Date    Chronic pain     Depression     Insomnia     Muscle relaxant overdose 07/03/2017    baclofen    Opiate overdose     Osteoarthritis of spine with radiculopathy, lumbar region     Osteopenia     Unilateral primary osteoarthritis, left hip      Past Surgical History:   Procedure Laterality Date    HIP SURGERY Left 2019    replacement    LUMBAR LAMINECTOMY WITH FUSION  07/2012    [facility]    PAIN PUMP IMPLANT  2019    SPINE SURGERY  2010    with hardware     Family History   Problem Relation Name Age of Onset    Cancer Mother          pancreas    Diabetes Mother      No Known Problems Father      No Known Problems Sister      No Known Problems Daughter      No Known Problems Maternal Aunt      No Known Problems Maternal Uncle      No Known Problems Paternal Aunt      No Known Problems Paternal Uncle      No Known Problems Maternal Grandmother      No Known Problems Maternal Grandfather      No Known Problems Paternal Grandmother      No Known Problems  Paternal Grandfather      No Known Problems Other      Breast cancer Neg Hx      Ovarian cancer Neg Hx      BRCA 1/2 Neg Hx       Social History     Tobacco Use    Smoking status: Former     Current packs/day: 1.00     Types: Cigarettes    Smokeless tobacco: Never   Substance Use Topics    Alcohol use: Yes    Drug use: Never     Review of Systems   Respiratory:  Negative for shortness of breath.    Cardiovascular:  Positive for chest pain. Negative for palpitations and leg swelling.   Skin:  Negative for color change.       Physical Exam     Initial Vitals [12/06/24 1445]   BP Pulse Resp Temp SpO2   (!) 154/71 92 18 98.4 °F (36.9 °C) 97 %      MAP       --         Physical Exam    Nursing note and vitals reviewed.  Constitutional: She appears well-developed and well-nourished. She is active. No distress.   HENT:   Head: Normocephalic and atraumatic.   Eyes: EOM are normal. Pupils are equal, round, and reactive to light.   Neck: Neck supple.   Normal range of motion.  Cardiovascular:  Normal rate, regular rhythm and normal heart sounds.           Pulmonary/Chest: Breath sounds normal. No respiratory distress. She exhibits no tenderness.   The right chest appears normal upon inspection, no rash or discoloration, no bruising.  Patient does not experience any apparent bony tenderness.  No crepitus.   Musculoskeletal:      Cervical back: Normal range of motion and neck supple.     Neurological: She is alert and oriented to person, place, and time. GCS eye subscore is 4. GCS verbal subscore is 5. GCS motor subscore is 6.   Skin: Skin is warm and dry. Capillary refill takes less than 2 seconds.   Psychiatric: She has a normal mood and affect. Her behavior is normal. Thought content normal.         ED Course   Procedures  Labs Reviewed - No data to display       Imaging Results              X-Ray Chest AP Portable (In process)                      Medications   dexAMETHasone injection 4 mg (has no administration in time  range)     Medical Decision Making             ED Course as of 12/06/24 1520   Fri Dec 06, 2024   1520 No acute findings on chest x-ray [CB]      ED Course User Index  [CB] Jessica David NP                           Clinical Impression:  Final diagnoses:  [M94.0] Costochondritis, acute (Primary)          ED Disposition Condition    Discharge Stable          ED Prescriptions       Medication Sig Dispense Start Date End Date Auth. Provider    predniSONE (DELTASONE) 20 MG tablet Take 1 tablet (20 mg total) by mouth once daily. for 5 days 5 tablet 12/6/2024 12/11/2024 Jessica David NP          Follow-up Information       Follow up With Specialties Details Why Contact Info    PCP Follow UP  Call in 3 days for follow-up, for re-evaluation of today's complaint              Jessica David NP  12/06/24 1520

## 2025-01-30 ENCOUNTER — HOSPITAL ENCOUNTER (EMERGENCY)
Facility: HOSPITAL | Age: 60
Discharge: HOME | End: 2025-01-30
Attending: EMERGENCY MEDICINE
Payer: MEDICARE

## 2025-01-30 VITALS
HEART RATE: 100 BPM | RESPIRATION RATE: 18 BRPM | DIASTOLIC BLOOD PRESSURE: 66 MMHG | WEIGHT: 214 LBS | SYSTOLIC BLOOD PRESSURE: 137 MMHG | BODY MASS INDEX: 34.39 KG/M2 | OXYGEN SATURATION: 96 % | TEMPERATURE: 99 F | HEIGHT: 66 IN

## 2025-01-30 DIAGNOSIS — S69.91XA INJURY OF FINGER OF RIGHT HAND, INITIAL ENCOUNTER: Primary | ICD-10-CM

## 2025-01-30 DIAGNOSIS — H10.023 OTHER MUCOPURULENT CONJUNCTIVITIS OF BOTH EYES: ICD-10-CM

## 2025-01-30 PROCEDURE — 99283 EMERGENCY DEPT VISIT LOW MDM: CPT | Mod: 25

## 2025-01-30 PROCEDURE — 29130 APPL FINGER SPLINT STATIC: CPT | Mod: F9

## 2025-01-30 RX ORDER — GABAPENTIN 800 MG/1
800 TABLET ORAL 3 TIMES DAILY
COMMUNITY
Start: 2025-01-15

## 2025-01-30 RX ORDER — MOXIFLOXACIN 5 MG/ML
1 SOLUTION/ DROPS OPHTHALMIC 3 TIMES DAILY
Qty: 3 ML | Refills: 0 | Status: SHIPPED | OUTPATIENT
Start: 2025-01-30 | End: 2025-02-04

## 2025-01-30 RX ORDER — DICLOFENAC SODIUM 50 MG/1
50 TABLET, DELAYED RELEASE ORAL 3 TIMES DAILY PRN
Qty: 15 TABLET | Refills: 0 | Status: SHIPPED | OUTPATIENT
Start: 2025-01-30

## 2025-01-30 NOTE — ED PROVIDER NOTES
Encounter Date: 1/30/2025       History     Chief Complaint   Patient presents with    Finger Injury     Pt stated that she had a fall couple weeks ago and thinks she may have broken right 5th finger - continued pain decreased ROM - unable to extend fully.      This note is dictated on M*Modal word recognition program.  There are word recognition mistakes and grammatical errors that are occasionally missed on review.     Kymberly Mckinley is a 59 y.o. female with a past medical history of insomnia, chronic pain, depression presents to ER today with complaints of right pinky finger pain and deformity for the last few weeks.  Patient reports she fell and injured her right 5th finger patient reports continued pain, deformity, decreased range of motion.  Unable to fully extend right pinky at this time rates pain 5/10.  Patient also reports a whitish/thick discharge from bilateral eyes for the past few weeks as well      The history is provided by the patient.     Review of patient's allergies indicates:   Allergen Reactions    Tizanidine      Other reaction(s): Nausea    Tramadol Hives     Past Medical History:   Diagnosis Date    Chronic pain     Depression     Insomnia     Muscle relaxant overdose 07/03/2017    baclofen    Opiate overdose     Osteoarthritis of spine with radiculopathy, lumbar region     Osteopenia     Unilateral primary osteoarthritis, left hip      Past Surgical History:   Procedure Laterality Date    HIP SURGERY Left 2019    replacement    LUMBAR LAMINECTOMY WITH FUSION  07/2012    [facility]    PAIN PUMP IMPLANT  2019    SPINE SURGERY  2010    with hardware     Family History   Problem Relation Name Age of Onset    Cancer Mother          pancreas    Diabetes Mother      No Known Problems Father      No Known Problems Sister      No Known Problems Daughter      No Known Problems Maternal Aunt      No Known Problems Maternal Uncle      No Known Problems Paternal Aunt      No Known Problems Paternal  Uncle      No Known Problems Maternal Grandmother      No Known Problems Maternal Grandfather      No Known Problems Paternal Grandmother      No Known Problems Paternal Grandfather      No Known Problems Other      Breast cancer Neg Hx      Ovarian cancer Neg Hx      BRCA 1/2 Neg Hx       Social History     Tobacco Use    Smoking status: Former     Current packs/day: 1.00     Types: Cigarettes     Passive exposure: Past    Smokeless tobacco: Never   Substance Use Topics    Alcohol use: Yes    Drug use: Never     Review of Systems   Eyes:  Positive for discharge.   Musculoskeletal:  Positive for arthralgias, joint swelling and myalgias.   All other systems reviewed and are negative.      Physical Exam     Initial Vitals   BP Pulse Resp Temp SpO2   01/30/25 1142 01/30/25 1141 01/30/25 1141 01/30/25 1141 01/30/25 1141   137/66 100 18 98.5 °F (36.9 °C) 96 %      MAP       --                Physical Exam    Constitutional: She appears well-developed and well-nourished.   HENT:   Head: Normocephalic and atraumatic.   Eyes: Pupils are equal, round, and reactive to light. Right eye exhibits discharge. Left eye exhibits discharge.   Clearish/white thick discharge to bilateral conjunctiva   Neck: Neck supple.   Normal range of motion.  Cardiovascular:  Normal rate.     Exam reveals no friction rub.       No murmur heard.  Pulmonary/Chest: No respiratory distress. She has no wheezes.   Abdominal: Abdomen is soft. She exhibits no distension. There is no abdominal tenderness.   Musculoskeletal:         General: Tenderness present.      Cervical back: Normal range of motion and neck supple.      Comments: Patient has deformity, tenderness, decreased range of motion to right pinky finger right hand neurovascularly intact on examination     Neurological: She is alert and oriented to person, place, and time. GCS score is 15. GCS eye subscore is 4. GCS verbal subscore is 5. GCS motor subscore is 6.   Skin: Capillary refill takes  less than 2 seconds. No erythema.         ED Course   Procedures  Labs Reviewed - No data to display       Imaging Results              X-Ray Finger 2 or More Views Right (In process)                      Medications - No data to display  Medical Decision Making  DDx includes conjunctivitis, finger dislocation, finger fracture, trigger finger    Differential diagnosis include finger dislocation, finger fracture, trigger finger, finger injury    X-ray of right finger reviewed no obvious dislocation  Splint applied to right pinky finger.  Ambulatory referral placed to [facility]  Will treat patient's eye discharge with Vigamox  Right hand/right pinky finger neurovascularly intact at time of discharge  Patient stable at time of discharge in no acute distress.  No life-threatening illnesses were found during ER visit today.  Patient was instructed to follow-up with PCP or other recommended specialist within the next 48-72 hours.  Patient was instructed to return to ER immediately for any worsening or concerning symptoms.  All discharge instructions discussed with patient, and patient agrees to comply with discharge instructions given today.     Amount and/or Complexity of Data Reviewed  Radiology: ordered.    Risk  Prescription drug management.                                      Clinical Impression:  Final diagnoses:  [S69.91XA] Injury of finger of right hand, initial encounter (Primary)  [H10.023] Other mucopurulent conjunctivitis of both eyes          ED Disposition Condition    Discharge Stable          ED Prescriptions       Medication Sig Dispense Start Date End Date Auth. Provider    moxifloxacin (VIGAMOX) 0.5 % ophthalmic solution Place 1 drop into both eyes 3 (three) times daily. for 5 days 3 mL 1/30/2025 2/4/2025 Otis Causey NP    diclofenac (VOLTAREN) 50 MG EC tablet Take 1 tablet (50 mg total) by mouth 3 (three) times daily as needed (Pain). 15 tablet 1/30/2025 -- Otis Causey NP           Follow-up Information       Follow up With Specialties Details Why Contact Info    Cezar Cortez MD Ophthalmology Schedule an appointment as soon as possible for a visit in 3 days  [address] [facility]  [place] OPHTHALMOLOGY  [facility] [address]  [phone]               Otis Causey, NP  01/30/25 0587

## 2025-01-30 NOTE — DISCHARGE INSTRUCTIONS
Please maintain splint usage to right pinky finger.  Follow-up with orthopedics.  I have placed a referral for you to follow-up with [facility] orthopedic team  Take diclofenac as needed for pain  Use eyedrops 3 times a day to both eyes for the next 5 days as prescribed  Follow-up with eye doctor please

## 2025-01-30 NOTE — DISCHARGE SUMMARY
[facility] Hospital Medicine  Discharge Summary      Patient Name: Kymberly Mckinley  MRN: [id number]  Admission Date: 7/3/2017  Hospital Length of Stay: 0 days  Discharge Date and Time: 7/4/2017  2:50 PM  Attending Physician: No att. providers found   Discharging Provider: Michele Powell MD  Primary Care Provider: Lakshmi Espinosa MD      HPI:   Kymberly Mckinley is a 51 y.o. white woman with obesity, lumbar spondylosis with radiculopathy status post spinal surgery, depression, alcohol abuse (drinks about 5 beers a day), opiate dependence with history of overdose, and depression.  She lives in [place], [place].  She is originally from [place].  She is .  Her pain management specialist is Dr. Keenan Ford.    She developed acute confusion, incoherent speech, and generalized muscle weakness around 17:30 on 7/2/17 while with her  at home.  She had been on medications for chronic pain including opiates and pregabalin, but Dr. Ford stopped prescribing her medications and she was nearly out of her medications.  Her son has been in the ICU for a thyroid complication and she had significant stress.  Her  takes opiates and muscle relaxants and could not find his bottle of muscle relaxants.  She was taken to [facility].  She was combative and required soft wrist and ankle restraints.  Drug screen was negative.  Head CT had motion artifact.  Due to lack of Neurology, she was transferred to [facility].     * No surgery found *      Indwelling Lines/Drains at time of discharge:   Lines/Drains/Airways          No matching active lines, drains, or airways        Hospital Course:   Muscle relaxant overdose worsened by her daily alcohol abuse was suspected.  Neurology recommended extensive workup.  She was found to have vitamin B12 deficiency.  The next morning her mentation improved and she confirmed the suspicion by reporting that she had taken  the bottle of baclofen that her  could not find, and had taken several baclofen.        Daily Note:  Back to baseline today per . Does not want to undergo any MRIs or EEGs.      Consults:   Consults         Status Ordering Provider     Inpatient consult to Neurology  Once     Provider:  (Not yet assigned)    FRANCISCA Pack          Significant Diagnostic Studies: Labs:B12 197    Pending Diagnostic Studies:     Procedure Component Value Units Date/Time    HIV-1 and HIV-2 antibodies [[id number]] Collected:  07/03/17 1940    Order Status:  Sent Lab Status:  In process Updated:  07/03/17 2229    Specimen:  Blood from Blood         Final Active Diagnoses:    Diagnosis Date Noted POA    PRINCIPAL PROBLEM:  Drug-induced encephalopathy [G92] 07/03/2017 Yes    Vitamin B12 deficiency [E53.8] 07/04/2017 Yes    Alcohol abuse, daily use [F10.10] 07/03/2017 Yes     Chronic    Opiate dependence [F11.20] 07/03/2017 Yes    Osteoarthritis of spine with radiculopathy, lumbar region [M47.26]  Yes     Chronic      Problems Resolved During this Admission:    Diagnosis Date Noted Date Resolved POA      Vitamin B12 deficiency    Start on B12 replacement.           Osteoarthritis of spine with radiculopathy, lumbar region    Opiate dependence  Unknown what she was taking before but all her medications were stopped by Dr. Ford.  Give PRN acetaminophen and tramadol.          Alcohol abuse, daily use    Monitor for withdrawal.          * Drug-induced encephalopathy    Took her 's baclofen, which would have then interacted with the large amount of beer she normally drinks.  Appreciate Neurology.  No further evaluation needed given her return to baseline.             Discharged Condition: good    Disposition: Home or Self Care    Follow Up:  Follow-up Information     Lakshmi Espinosa MD. Schedule an appointment as soon as possible for a visit in 4 weeks.    Specialty:  Family Medicine  Contact  "information:  [address]   [address]  [facility] [address]  [phone]                 Patient Instructions:     Diet general     Activity as tolerated     Call MD for:  temperature >100.4     Call MD for:  persistent nausea and vomiting or diarrhea     Call MD for:  severe uncontrolled pain     Call MD for:  persistent dizziness, light-headedness, or visual disturbances     Call MD for:  increased confusion or weakness       Medications:  Reconciled Home Medications:   Discharge Medication List as of 7/4/2017  1:59 PM      START taking these medications    Details   cyanocobalamin 1,000 mcg/mL injection Inject 1000 mcg subcutaneously daily for 6 days, then weekly for 4 weeks, then monthly thereafter., Normal      syringe with needle 1 mL 28 gauge x 1/2" Syrg Use to administer cyancobalamin (Vitamin B12) as directed., Normal           Time spent on the discharge of patient: 35 minutes    Michele Powell MD  Department of Hospital Medicine  [facility]  " 0

## 2025-02-18 ENCOUNTER — HOSPITAL ENCOUNTER (EMERGENCY)
Facility: HOSPITAL | Age: 60
Discharge: HOME | End: 2025-02-18
Attending: EMERGENCY MEDICINE
Payer: MEDICARE

## 2025-02-18 VITALS
HEART RATE: 95 BPM | OXYGEN SATURATION: 95 % | WEIGHT: 214 LBS | RESPIRATION RATE: 20 BRPM | BODY MASS INDEX: 34.39 KG/M2 | DIASTOLIC BLOOD PRESSURE: 63 MMHG | HEIGHT: 66 IN | TEMPERATURE: 99 F | SYSTOLIC BLOOD PRESSURE: 140 MMHG
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
## 2025-02-18 DIAGNOSIS — G25.9 MOVEMENT DISORDER: ICD-10-CM

## 2025-02-18 DIAGNOSIS — M79.641 RIGHT HAND PAIN: ICD-10-CM

## 2025-02-18 DIAGNOSIS — R45.1 RESTLESSNESS: ICD-10-CM

## 2025-02-18 LAB
ALBUMIN SERPL BCP-MCNC: 4 G/DL (ref 3.5–5.2)
ALP SERPL-CCNC: 115 U/L (ref 40–150)
ALT SERPL W/O P-5'-P-CCNC: 18 U/L (ref 10–44)
AMPHET+METHAMPHET UR QL: ABNORMAL
ANION GAP SERPL CALC-SCNC: 10 MMOL/L (ref 8–16)
AST SERPL-CCNC: 24 U/L (ref 10–40)
BACTERIA #/AREA URNS AUTO: ABNORMAL /HPF
BARBITURATES UR QL SCN>200 NG/ML: NEGATIVE
BASOPHILS # BLD AUTO: 0.04 K/UL (ref 0–0.2)
BASOPHILS NFR BLD: 0.4 % (ref 0–1.9)
BENZODIAZ UR QL SCN>200 NG/ML: NEGATIVE
BILIRUB SERPL-MCNC: 0.3 MG/DL (ref 0.1–1)
BILIRUB UR QL STRIP: NEGATIVE
BUN SERPL-MCNC: 11 MG/DL (ref 6–20)
BZE UR QL SCN: NEGATIVE
CALCIUM SERPL-MCNC: 9.5 MG/DL (ref 8.7–10.5)
CANNABINOIDS UR QL SCN: NEGATIVE
CHLORIDE SERPL-SCNC: 103 MMOL/L (ref 95–110)
CK SERPL-CCNC: 315 U/L (ref 20–180)
CLARITY UR REFRACT.AUTO: ABNORMAL
CO2 SERPL-SCNC: 27 MMOL/L (ref 23–29)
COLOR UR AUTO: YELLOW
CREAT SERPL-MCNC: 0.7 MG/DL (ref 0.5–1.4)
CREAT UR-MCNC: 194 MG/DL (ref 15–325)
DIFFERENTIAL METHOD BLD: ABNORMAL
EOSINOPHIL # BLD AUTO: 0.2 K/UL (ref 0–0.5)
EOSINOPHIL NFR BLD: 1.8 % (ref 0–8)
ERYTHROCYTE [DISTWIDTH] IN BLOOD BY AUTOMATED COUNT: 14.3 % (ref 11.5–14.5)
EST. GFR  (NO RACE VARIABLE): >60 ML/MIN/1.73 M^2
ETHANOL SERPL-MCNC: <10 MG/DL
GLUCOSE SERPL-MCNC: 93 MG/DL (ref 70–110)
GLUCOSE UR QL STRIP: NEGATIVE
HCT VFR BLD AUTO: 31.7 % (ref 37–48.5)
HGB BLD-MCNC: 9.8 G/DL (ref 12–16)
HGB UR QL STRIP: ABNORMAL
HYALINE CASTS UR QL AUTO: 3 /LPF
IMM GRANULOCYTES # BLD AUTO: 0.03 K/UL (ref 0–0.04)
IMM GRANULOCYTES NFR BLD AUTO: 0.3 % (ref 0–0.5)
KETONES UR QL STRIP: ABNORMAL
LEUKOCYTE ESTERASE UR QL STRIP: ABNORMAL
LYMPHOCYTES # BLD AUTO: 1.4 K/UL (ref 1–4.8)
LYMPHOCYTES NFR BLD: 15.2 % (ref 18–48)
MAGNESIUM SERPL-MCNC: 1.9 MG/DL (ref 1.6–2.6)
MCH RBC QN AUTO: 28.2 PG (ref 27–31)
MCHC RBC AUTO-ENTMCNC: 30.9 G/DL (ref 32–36)
MCV RBC AUTO: 91 FL (ref 82–98)
METHADONE UR QL SCN>300 NG/ML: NEGATIVE
MICROSCOPIC COMMENT: ABNORMAL
MONOCYTES # BLD AUTO: 1.1 K/UL (ref 0.3–1)
MONOCYTES NFR BLD: 11.8 % (ref 4–15)
NEUTROPHILS # BLD AUTO: 6.5 K/UL (ref 1.8–7.7)
NEUTROPHILS NFR BLD: 70.5 % (ref 38–73)
NITRITE UR QL STRIP: NEGATIVE
NON-SQ EPI CELLS #/AREA URNS AUTO: 0 /HPF
NRBC BLD-RTO: 0 /100 WBC
OPIATES UR QL SCN: ABNORMAL
PCP UR QL SCN>25 NG/ML: NEGATIVE
PH UR STRIP: 6 [PH] (ref 5–8)
PLATELET # BLD AUTO: 249 K/UL (ref 150–450)
PMV BLD AUTO: 8.5 FL (ref 9.2–12.9)
POTASSIUM SERPL-SCNC: 4.7 MMOL/L (ref 3.5–5.1)
PROT SERPL-MCNC: 7 G/DL (ref 6–8.4)
PROT UR QL STRIP: ABNORMAL
RBC # BLD AUTO: 3.48 M/UL (ref 4–5.4)
RBC #/AREA URNS AUTO: 7 /HPF (ref 0–4)
SODIUM SERPL-SCNC: 140 MMOL/L (ref 136–145)
SP GR UR STRIP: 1.03 (ref 1–1.03)
SQUAMOUS #/AREA URNS AUTO: 2 /HPF
TOXICOLOGY INFORMATION: ABNORMAL
TSH SERPL DL<=0.005 MIU/L-ACNC: 2.21 UIU/ML (ref 0.4–4)
URN SPEC COLLECT METH UR: ABNORMAL
WBC # BLD AUTO: 9.2 K/UL (ref 3.9–12.7)
WBC #/AREA URNS AUTO: 95 /HPF (ref 0–5)

## 2025-02-18 PROCEDURE — 99285 EMERGENCY DEPT VISIT HI MDM: CPT | Mod: 25

## 2025-02-18 PROCEDURE — 87086 URINE CULTURE/COLONY COUNT: CPT | Performed by: PHYSICIAN ASSISTANT

## 2025-02-18 PROCEDURE — 85025 COMPLETE CBC W/AUTO DIFF WBC: CPT | Performed by: PHYSICIAN ASSISTANT

## 2025-02-18 PROCEDURE — 87186 SC STD MICRODIL/AGAR DIL: CPT | Performed by: PHYSICIAN ASSISTANT

## 2025-02-18 PROCEDURE — 80307 DRUG TEST PRSMV CHEM ANLYZR: CPT | Performed by: PHYSICIAN ASSISTANT

## 2025-02-18 PROCEDURE — 80053 COMPREHEN METABOLIC PANEL: CPT | Performed by: PHYSICIAN ASSISTANT

## 2025-02-18 PROCEDURE — 84443 ASSAY THYROID STIM HORMONE: CPT | Performed by: PHYSICIAN ASSISTANT

## 2025-02-18 PROCEDURE — 83735 ASSAY OF MAGNESIUM: CPT | Performed by: PHYSICIAN ASSISTANT

## 2025-02-18 PROCEDURE — 82077 ASSAY SPEC XCP UR&BREATH IA: CPT | Performed by: PHYSICIAN ASSISTANT

## 2025-02-18 PROCEDURE — 82550 ASSAY OF CK (CPK): CPT | Performed by: PHYSICIAN ASSISTANT

## 2025-02-18 PROCEDURE — 87088 URINE BACTERIA CULTURE: CPT | Performed by: PHYSICIAN ASSISTANT

## 2025-02-18 PROCEDURE — 81001 URINALYSIS AUTO W/SCOPE: CPT | Mod: XB | Performed by: PHYSICIAN ASSISTANT

## 2025-02-18 NOTE — CODE/ RAPID DOCUMENTATION
RAPID RESPONSE NURSE NOTE        Admit Date: (Not on file)  LOS: 0  Code Status: Prior   Date of Consult: 2025  : 1965  Age: 59 y.o.  Weight:   Wt Readings from Last 1 Encounters:   25 97.1 kg (214 lb)     Sex: female  Race: White   Bed: Room/bed info not found:   MRN: [id number]  Time Rapid Response Team page Received: 1145  Time Rapid Response Team at Bedside: 1147  Time Rapid Response Team left Bedside: 1210  Was the patient discharged from an ICU this admission? No  Was the patient discharged from a PACU within last 24 hours? No   Did the patient receive conscious sedation/general anesthesia in last 24 hours? No  Was the patient in the ED within the past 24 hours? No  Was the patient on NIPPV within the past 24 hours? No   Did this progress into an ARC or CPA: No  Attending Physician: NA  Primary Service: NA    SITUATION    Notified by overhead page.  Reason for alert: Code blue called overhead  Called to evaluate the patient for  Fall    BACKGROUND     Why is the patient in the hospital?: S/p clinic visit    Patient has a past medical history of Chronic pain, Depression, Insomnia, Muscle relaxant overdose, Opiate overdose, Osteoarthritis of spine with radiculopathy, lumbar region, Osteopenia, and Unilateral primary osteoarthritis, left hip.    ASSESSMENT/INTERVENTIONS    What did you find: Patient lying on ground, moving all extremities. NAD noted. Denies LOC or head trauma. Moving neck on RRT arrival. Patient mobilized to stretcher and brought to ED for evaluation.    RECOMMENDATIONS    We recommend: ED transfer and evaluation.    PROVIDER ESCALATION    Orders received and case discussed with  CHANDLER Carrasquillo NP.    FOLLOW UP    Call the Rapid Response Nurse, Elana Tracy RN at [phone] for additional questions or concerns.

## 2025-02-18 NOTE — ED PROVIDER NOTES
Encounter Date: 2/18/2025       History     Chief Complaint   Patient presents with    Fall     Mechanical fall in atrium.  C/o back pain hip pain and foot pain.  No deformity noted.  Has pain pump in back.      The history is provided by the patient and medical records. No  was used.     Kymberly Mckinley is a 59 y.o. female with medical history of extrapyramidal movement disorder, depression, Hx of ETOH use presenting to the ED with the chief complaint of fall.     Patient referred to the ED after experiencing a witnessed fall in the hospital atrium. Patient was accompanying her spouse who was having a procedure. Reports she was walking and her L hip gave out on her causing to fall onto both her hands. Denies head trauma or LOC. She has been having R 5th finger pain for a few weeks that has worsened after the fall. She has a pain pump in her back that gives her Baclofen and Dilaudid. She additionally takes Gabapentin.     Patient's spouse expresses concern for intermittent restlessness. Reports for 3-4 days patient will be constantly moving her extremities and rocking back and forth. Reports she constantly rolls around in the bed whenever she is sleeping at night. She saw her PCP for similar in 5/2024 and advised her to follow-up with Neurology which has not occurred. Reports last drinking ETOH heavily over a year ago. Occasionally drinks a beer. Denies other recreational drug use. She is on phetermine for weight loss.     Review of patient's allergies indicates:   Allergen Reactions    Tizanidine      Other reaction(s): Nausea    Tramadol Hives     Past Medical History:   Diagnosis Date    Chronic pain     Depression     Insomnia     Muscle relaxant overdose 07/03/2017    baclofen    Opiate overdose     Osteoarthritis of spine with radiculopathy, lumbar region     Osteopenia     Unilateral primary osteoarthritis, left hip      Past Surgical History:   Procedure Laterality Date    HIP SURGERY  Left 2019    replacement    LUMBAR LAMINECTOMY WITH FUSION  07/2012    Kari    PAIN PUMP IMPLANT  2019    SPINE SURGERY  2010    with hardware     Family History   Problem Relation Name Age of Onset    Cancer Mother          pancreas    Diabetes Mother      No Known Problems Father      No Known Problems Sister      No Known Problems Daughter      No Known Problems Maternal Aunt      No Known Problems Maternal Uncle      No Known Problems Paternal Aunt      No Known Problems Paternal Uncle      No Known Problems Maternal Grandmother      No Known Problems Maternal Grandfather      No Known Problems Paternal Grandmother      No Known Problems Paternal Grandfather      No Known Problems Other      Breast cancer Neg Hx      Ovarian cancer Neg Hx      BRCA 1/2 Neg Hx       Social History[1]  Review of Systems   Musculoskeletal:  Positive for arthralgias.       Physical Exam     Initial Vitals [02/18/25 1202]   BP Pulse Resp Temp SpO2   (!) 140/63 95 20 99 °F (37.2 °C) 95 %      MAP       --         Physical Exam    Constitutional: She appears well-developed and well-nourished. She is not diaphoretic. No distress.   HENT:   Head: Normocephalic and atraumatic. Mouth/Throat: Oropharynx is clear and moist. No oropharyngeal exudate.   Eyes: Conjunctivae and EOM are normal. Pupils are equal, round, and reactive to light. No scleral icterus.   Neck: Neck supple.   Normal range of motion.  Cardiovascular:  Normal rate and regular rhythm.           Pulmonary/Chest: Breath sounds normal. No respiratory distress. She has no wheezes.   Abdominal: Abdomen is soft. She exhibits no distension. There is no abdominal tenderness. There is no rebound.   Musculoskeletal:         General: No tenderness or edema. Normal range of motion.      Cervical back: Normal range of motion and neck supple.      Comments: No snuff box tenderness     Neurological: She is alert and oriented to person, place, and time. She has normal strength. No  sensory deficit.   Rocking back and forth on exam bed. Constantly moving both arms around. Occasionally grimacing. Ambulates down hallway independently   Skin: Skin is warm and dry. No rash noted. No erythema.   Psychiatric: She has a normal mood and affect.         ED Course   Procedures  Labs Reviewed   CBC W/ AUTO DIFFERENTIAL - Abnormal       Result Value    WBC 9.20      RBC 3.48 (*)     Hemoglobin 9.8 (*)     Hematocrit 31.7 (*)     MCV 91      MCH 28.2      MCHC 30.9 (*)     RDW 14.3      Platelets 249      MPV 8.5 (*)     Immature Granulocytes 0.3      Gran # (ANC) 6.5      Immature Grans (Abs) 0.03      Lymph # 1.4      Mono # 1.1 (*)     Eos # 0.2      Baso # 0.04      nRBC 0      Gran % 70.5      Lymph % 15.2 (*)     Mono % 11.8      Eosinophil % 1.8      Basophil % 0.4      Differential Method Automated     CK - Abnormal     (*)    URINALYSIS, REFLEX TO URINE CULTURE - Abnormal    Specimen UA Urine, Clean Catch      Color, UA Yellow      Appearance, UA Hazy (*)     pH, UA 6.0      Specific Gravity, UA 1.030      Protein, UA Trace (*)     Glucose, UA Negative      Ketones, UA Trace (*)     Bilirubin (UA) Negative      Occult Blood UA Trace (*)     Nitrite, UA Negative      Leukocytes, UA 3+ (*)     Narrative:     Specimen Source->Urine   DRUG SCREEN PANEL, URINE EMERGENCY - Abnormal    Benzodiazepines Negative      Methadone metabolites Negative      Cocaine (Metab.) Negative      Opiate Scrn, Ur Presumptive Positive (*)     Barbiturate Screen, Ur Negative      Amphetamine Screen, Ur Presumptive Positive (*)     THC Negative      Phencyclidine Negative      Creatinine, Urine 194.0      Toxicology Information SEE COMMENT      Narrative:     Specimen Source->Urine   URINALYSIS MICROSCOPIC - Abnormal    RBC, UA 7 (*)     WBC, UA 95 (*)     Bacteria Many (*)     Squam Epithel, UA 2      Non-Squam Epith 0      Hyaline Casts, UA 3 (*)     Microscopic Comment SEE COMMENT      Narrative:     Specimen  Source->Urine   CULTURE, URINE   COMPREHENSIVE METABOLIC PANEL    Sodium 140      Potassium 4.7      Chloride 103      CO2 27      Glucose 93      BUN 11      Creatinine 0.7      Calcium 9.5      Total Protein 7.0      Albumin 4.0      Total Bilirubin 0.3      Alkaline Phosphatase 115      AST 24      ALT 18      eGFR >60.0      Anion Gap 10     MAGNESIUM    Magnesium 1.9     TSH    TSH 2.214     ALCOHOL,MEDICAL (ETHANOL)    Alcohol, Serum <10            Imaging Results              CT Head Without Contrast (Final result)  Result time 02/18/25 16:53:02      Final result by Roe Dent MD (02/18/25 16:53:02)                   Impression:      No evidence of acute hemorrhage or major vascular distribution infarct.    Electronically signed by resident: Elliot Paris  Date:    02/18/2025  Time:    16:46    Electronically signed by: Roe Dent MD  Date:    02/18/2025  Time:    16:53               Narrative:    EXAMINATION:  CT HEAD WITHOUT CONTRAST    CLINICAL HISTORY:  Neuro deficit, acute, stroke suspected;    TECHNIQUE:  Low dose axial CT images obtained throughout the head without the use of intravenous contrast.  Axial, sagittal and coronal reconstructions were performed.    COMPARISON:  CT 10/26/2015    FINDINGS:  Intracranial compartment:    Ventricles and sulci are normal in size for age without evidence of hydrocephalus.    The brain parenchyma appears within normal limits.  No parenchymal  hemorrhage, edema, mass effect or major vascular distribution infarct.    No extra-axial blood or fluid collections.    Skull/extracranial contents (limited evaluation):    No displaced calvarial fracture.    The mastoid air cells and visualized paranasal sinuses are essentially clear.                                       X-Ray Hand 2 View Bilat (Final result)  Result time 02/18/25 15:59:54   Procedure changed from X-Ray Hand 2 View Left     Final result by Murtaza Grove MD (02/18/25 15:59:54)                    Impression:      No acute displaced fracture-dislocation identified at either hand.      Electronically signed by: Murtaza Grove MD  Date:    02/18/2025  Time:    15:59               Narrative:    EXAMINATION:  XR HAND 2 VIEW BILAT    CLINICAL HISTORY:  FALL;  Unspecified fall, initial encounter    TECHNIQUE:  Three views of the right and left hands totaling 6 views    COMPARISON:  Right finger series 1/30/25    FINDINGS:  Right hand: Overall alignment is within normal limits.  No displaced fracture, dislocation or destructive osseous process.  Baseline minimal to mild DJD.  No subcutaneous emphysema or radiopaque foreign body.    Left hand: Suspected IV line in place at the radial and dorsal aspect of the hand.  No subcutaneous emphysema.  Overall alignment is within normal limits.  No displaced fracture, dislocation or destructive osseous process.  Baseline minimal to mild DJD.                                       X-Ray Chest AP Portable (Final result)  Result time 02/18/25 15:01:13      Final result by Juancho Landin MD (02/18/25 15:01:13)                   Impression:      Linear densities within the left lung base likely representing linear atelectasis.  Lungs are otherwise clear.      Electronically signed by: Juancho Landin MD  Date:    02/18/2025  Time:    15:01               Narrative:    EXAMINATION:  XR CHEST AP PORTABLE    CLINICAL HISTORY:  Unspecified fall, initial encounter    TECHNIQUE:  Single frontal view of the chest was performed.    COMPARISON:  12/06/2024.    FINDINGS:  The heart and mediastinal structures are unremarkable.  Pulmonary vasculature is within normal limits.  There are linear opacities within the left lung base laterally likely representing linear atelectasis.  The lungs are otherwise clear.  There is no evidence for pneumothorax or large pleural effusions.  Bony structures are grossly intact.  There is dextro convexity of the thoracic spine.                                        Medications - No data to display  Medical Decision Making  59 y.o. female with medical history of extrapyramidal movement disorder, depression, Hx of ETOH use presenting to the ED c/o ground-level fall in the atrium this afternoon. She was accompanying her spouse who was having a procedure done. She additionally reports having intermittent episodes of random movements in her extremities and difficulty sleeping at night. Previously saw her PCP on 5/2024 for this complaint.     Patient initially does not want to stay in the ED for work-up. Spouse at bedside talked with her and she is agreeable to stay after encouragement. She is having BL hand pain after the fall and will obtain XRs for further evaluation. In regards to her involuntary movements, this seems to be ongoing issue for several months. Saw her PCP in 5/2024 for this issue. DDx includes but not limited to EPS, dystonic reaction, idiopathic movement disorder, Parkinson's, chorea, drug reaction. Will obtain labs and CT head for further evaluation.     Amount and/or Complexity of Data Reviewed  Labs: ordered. Decision-making details documented in ED Course.  Radiology: ordered and independent interpretation performed.      APC / Resident Notes:  Work-up reviewed. CT head with no acute intracranial findings. XR BL hands with no evidence of fracture or dislocation. Labs without significant findings. ETOH <10. UDS +opiates and amphetamines which likely related to her pain pump and phetermine use. UA with elevated markers but she denies any urinary symptoms and less concerned for a UTI. I discussed treatment options with the patient and her spouse. Patient would prefer to return home at this time. Her symptoms have been ongoing for several months. No acute changes over the last few days. I discussed importance for neurology follow-up and place int/ext referrals for her. Patient expresses understanding and agreeable to the plan. Return to ED precautions given for  new, worsening, or concerning symptoms. I have discussed the care of this patient with my supervising physician.             Attending Attestation:     Physician Attestation Statement for NP/PA:   I personally made/approved the management plan and take responsibility for the patient management.                                     Clinical Impression:  Final diagnoses:  [W19.XXXA] Fall (Primary)  [M79.641] Right hand pain  [R45.1] Restlessness  [G25.9] Movement disorder          ED Disposition Condition    Discharge Stable          ED Prescriptions    None       Follow-up Information       Follow up With Specialties Details Why Contact Info Additional Information    [facility] - Neurology 7th Fl Neurology   [address]  [facility] [id number] [phone] [facility] [place] - Main Building, 7th Floor Please park in [facility] and take Clinic elevator    [facility] Surgical Oncology, Orthopedic Surgery, Genetics, Physical Medicine and Rehabilitation, Occupational Therapy, Radiology   [address] [facility] [address]  [phone]       Neurology/Ms/Stroke, [facility] - Neurology   [address] [facility] [address]  [phone]                  Murtaza Abebe PA-C  02/18/25 9886         [1]   Social History  Tobacco Use    Smoking status: Former     Current packs/day: 1.00     Types: Cigarettes     Passive exposure: Past    Smokeless tobacco: Never   Substance Use Topics    Alcohol use: Yes    Drug use: Never        Rashard Gray MD  02/19/25 1422

## 2025-02-18 NOTE — DISCHARGE INSTRUCTIONS
Use the below contact information to establish a follow-up with Neurology. You may have to follow-up with [facility] based on your insurance coverage.     Return to the emergency room for new, worsening, or concerning symptoms.

## 2025-02-18 NOTE — ED TRIAGE NOTES
Kymberly Mckinley, a 59 y.o. female presents to the ED w/ complaint of fall, pt denies LOC or hitting her head.    Triage note:  Chief Complaint   Patient presents with    Fall     Mechanical fall in atrium.  C/o back pain hip pain and foot pain.  No deformity noted.  Has pain pump in back.      Review of patient's allergies indicates:   Allergen Reactions    Tizanidine      Other reaction(s): Nausea    Tramadol Hives     Past Medical History:   Diagnosis Date    Chronic pain     Depression     Insomnia     Muscle relaxant overdose 07/03/2017    baclofen    Opiate overdose     Osteoarthritis of spine with radiculopathy, lumbar region     Osteopenia     Unilateral primary osteoarthritis, left hip

## 2025-02-19 ENCOUNTER — RESULTS FOLLOW-UP (OUTPATIENT)
Dept: EMERGENCY MEDICINE | Facility: HOSPITAL | Age: 60
End: 2025-02-19

## 2025-02-19 NOTE — PLAN OF CARE
Shawn faxed ambulatory referral to [facility] Neurology .        Solomon Hwang LMSW  Case Management  Emergency Department  [phone]

## 2025-02-20 LAB — BACTERIA UR CULT: ABNORMAL

## 2025-03-11 ENCOUNTER — TELEPHONE (OUTPATIENT)
Dept: EMERGENCY MEDICINE | Facility: HOSPITAL | Age: 60
End: 2025-03-11
Payer: MEDICARE

## 2025-03-11 DIAGNOSIS — N30.01 ACUTE CYSTITIS WITH HEMATURIA: Primary | ICD-10-CM

## 2025-03-11 RX ORDER — CEPHALEXIN 500 MG/1
500 CAPSULE ORAL EVERY 12 HOURS
Qty: 10 CAPSULE | Refills: 0 | Status: SHIPPED | OUTPATIENT
Start: 2025-03-11 | End: 2025-03-16

## 2025-03-11 NOTE — TELEPHONE ENCOUNTER
Patient called the emergency department after receiving a certified letter.  She was contacted regarding urine culture results.  I reviewed her results and the sensitivity report.  Will electronically prescribe Keflex to her pharmacy of choice.  She was instructed to follow up with her PCP in 1 week for recheck.  Additionally, she was advised to go to the ER for fever, chills, severe pain or any concerning symptoms.

## 2025-03-21 ENCOUNTER — HOSPITAL ENCOUNTER (EMERGENCY)
Facility: HOSPITAL | Age: 60
Discharge: HOME | End: 2025-03-21
Attending: EMERGENCY MEDICINE
Payer: MEDICARE

## 2025-03-21 VITALS
HEIGHT: 66 IN | DIASTOLIC BLOOD PRESSURE: 70 MMHG | HEART RATE: 93 BPM | WEIGHT: 223.13 LBS | TEMPERATURE: 99 F | OXYGEN SATURATION: 97 % | SYSTOLIC BLOOD PRESSURE: 149 MMHG | BODY MASS INDEX: 35.86 KG/M2 | RESPIRATION RATE: 18 BRPM

## 2025-03-21 DIAGNOSIS — H10.33 ACUTE CONJUNCTIVITIS OF BOTH EYES, UNSPECIFIED ACUTE CONJUNCTIVITIS TYPE: Primary | ICD-10-CM

## 2025-03-21 DIAGNOSIS — M25.551 CHRONIC PAIN OF RIGHT HIP: ICD-10-CM

## 2025-03-21 DIAGNOSIS — G89.29 CHRONIC PAIN OF RIGHT HIP: ICD-10-CM

## 2025-03-21 PROCEDURE — 99284 EMERGENCY DEPT VISIT MOD MDM: CPT

## 2025-03-21 RX ORDER — ERYTHROMYCIN 5 MG/G
OINTMENT OPHTHALMIC EVERY 6 HOURS
Qty: 3.5 G | Refills: 1 | Status: SHIPPED | OUTPATIENT
Start: 2025-03-21 | End: 2025-03-31

## 2025-03-21 RX ORDER — PREDNISONE 10 MG/1
10 TABLET ORAL DAILY
Qty: 21 TABLET | Refills: 0 | Status: SHIPPED | OUTPATIENT
Start: 2025-03-21

## 2025-03-21 NOTE — ED PROVIDER NOTES
"Encounter Date: 3/21/2025       History     Chief Complaint   Patient presents with    Eye Problem     Pt reports she was seen here and received an abx for eye infection. She reports the eye infection is back to both eyes. She reports blurred vision and crusting.      This is a 59-year-old white female with a history chronic extrapyramidal movement disorder, osteoarthritis, and chronic pain who presents to the emergency department for evaluation of "pinkeye".  Patient was evaluated by her eye doctor about 2 weeks ago and was prescribed ofloxacin for 5 days.  She reports symptoms began to improve but returned.  She reports bilateral eye discharge that is yellow/green and stringy.  Patient is also requesting steroids for chronic right hip pain, no change in symptoms from previous or recent injury.      Review of patient's allergies indicates:   Allergen Reactions    Tizanidine      Other reaction(s): Nausea    Tramadol Hives    Bactrim [sulfamethoxazole-trimethoprim] Other (See Comments)     "MAKES MY NERVES GO CRAZY"     Past Medical History:   Diagnosis Date    Chronic pain     Depression     Insomnia     Muscle relaxant overdose 07/03/2017    baclofen    Opiate overdose     Osteoarthritis of spine with radiculopathy, lumbar region     Osteopenia     Unilateral primary osteoarthritis, left hip      Past Surgical History:   Procedure Laterality Date    HIP SURGERY Left 2019    replacement    LUMBAR LAMINECTOMY WITH FUSION  07/2012    [facility]    PAIN PUMP IMPLANT  2019    SPINE SURGERY  2010    with hardware     Family History   Problem Relation Name Age of Onset    Cancer Mother          pancreas    Diabetes Mother      No Known Problems Father      No Known Problems Sister      No Known Problems Daughter      No Known Problems Maternal Aunt      No Known Problems Maternal Uncle      No Known Problems Paternal Aunt      No Known Problems Paternal Uncle      No Known Problems Maternal Grandmother      No Known " Problems Maternal Grandfather      No Known Problems Paternal Grandmother      No Known Problems Paternal Grandfather      No Known Problems Other      Breast cancer Neg Hx      Ovarian cancer Neg Hx      BRCA 1/2 Neg Hx       Social History[1]  Review of Systems   Constitutional:  Negative for appetite change and fever.   Eyes:  Positive for discharge, redness and visual disturbance (Blurry at times from discharge). Negative for pain.   Musculoskeletal:  Positive for arthralgias and gait problem.       Physical Exam     Initial Vitals [03/21/25 1113]   BP Pulse Resp Temp SpO2   (!) 149/70 93 18 98.5 °F (36.9 °C) 97 %      MAP       --         Physical Exam    Nursing note and vitals reviewed.  Constitutional: She appears well-developed and well-nourished. She is active. No distress.   HENT:   Head: Normocephalic and atraumatic.   Eyes: EOM are normal. Pupils are equal, round, and reactive to light. Right eye exhibits exudate. Left eye exhibits exudate. Right conjunctiva is not injected. Right conjunctiva has no hemorrhage. Left conjunctiva is not injected. Left conjunctiva has no hemorrhage.   Neck: Neck supple.   Normal range of motion.  Cardiovascular:  Normal rate.           Pulmonary/Chest: No respiratory distress.   Musculoskeletal:      Cervical back: Normal range of motion and neck supple.     Neurological: She is alert and oriented to person, place, and time. GCS eye subscore is 4. GCS verbal subscore is 5. GCS motor subscore is 6.   Skin: Skin is warm and dry. Capillary refill takes less than 2 seconds.   Psychiatric: She has a normal mood and affect. Her behavior is normal. Thought content normal.         ED Course   Procedures  Labs Reviewed - No data to display       Imaging Results    None          Medications - No data to display  Medical Decision Making  Risk  Prescription drug management.                                      Clinical Impression:  Final diagnoses:  [H10.33] Acute conjunctivitis of  both eyes, unspecified acute conjunctivitis type (Primary)  [M25.551, G89.29] Chronic pain of right hip          ED Disposition Condition    Discharge Stable          ED Prescriptions       Medication Sig Dispense Start Date End Date Auth. Provider    erythromycin (ROMYCIN) ophthalmic ointment Place into both eyes every 6 (six) hours. for 10 days 3.5 g 3/21/2025 3/31/2025 Jessica David NP    predniSONE (DELTASONE) 10 MG tablet Take 1 tablet (10 mg total) by mouth once daily. Take 4 tabs x 3 days, then take 2 tabs x 3 days, then take 1 tab x 3 days. 21 tablet 3/21/2025 -- Jessica David NP          Follow-up Information       Follow up With Specialties Details Why Contact Info    PCP Follow UP  Schedule an appointment as soon as possible for a visit in 2 days for follow-up, for re-evaluation of today's complaint     Your eye doctor  Schedule an appointment as soon as possible for a visit in 1 week for re-evaluation of today's complaint                [1]   Social History  Tobacco Use    Smoking status: Former     Current packs/day: 1.00     Types: Cigarettes     Passive exposure: Past    Smokeless tobacco: Never   Substance Use Topics    Alcohol use: Yes    Drug use: Never        Jessica David NP  03/21/25 [id number]

## 2025-03-21 NOTE — DISCHARGE INSTRUCTIONS
Be sure to complete eye drops for 10 days total.  Take steroids as directed.  Continue home medications as directed.  Plan to follow-up with your primary doctor and your eye doctor within the next week.  Return to the emergency department for worsening condition.

## 2025-03-25 ENCOUNTER — PATIENT OUTREACH (OUTPATIENT)
Facility: OTHER | Age: 60
End: 2025-03-25
Payer: MEDICARE

## 2025-03-26 NOTE — PROGRESS NOTES
ED Navigator attempted to contact patient on 2 separate occasions; patient is unable to reach. ED Navigator to close encounter at this time.      Mami Mcgregor  ED Navigator  [phone]

## 2025-04-17 PROBLEM — E66.811 CLASS 1 OBESITY DUE TO EXCESS CALORIES WITHOUT SERIOUS COMORBIDITY WITH BODY MASS INDEX (BMI) OF 33.0 TO 33.9 IN ADULT: Status: ACTIVE | Noted: 2025-04-17

## 2025-04-17 PROBLEM — M16.11 UNILATERAL PRIMARY OSTEOARTHRITIS, RIGHT HIP: Status: ACTIVE | Noted: 2025-04-17

## 2025-04-17 PROBLEM — Z01.818 PREOPERATIVE CLEARANCE: Status: ACTIVE | Noted: 2025-04-17

## 2025-04-17 PROBLEM — E66.09 CLASS 1 OBESITY DUE TO EXCESS CALORIES WITHOUT SERIOUS COMORBIDITY WITH BODY MASS INDEX (BMI) OF 33.0 TO 33.9 IN ADULT: Status: ACTIVE | Noted: 2025-04-17

## 2025-05-12 ENCOUNTER — HOSPITAL ENCOUNTER (EMERGENCY)
Facility: HOSPITAL | Age: 60
Discharge: HOME | End: 2025-05-12
Attending: EMERGENCY MEDICINE
Payer: MEDICARE

## 2025-05-12 VITALS
SYSTOLIC BLOOD PRESSURE: 159 MMHG | TEMPERATURE: 101 F | BODY MASS INDEX: 37.16 KG/M2 | WEIGHT: 231.25 LBS | RESPIRATION RATE: 16 BRPM | HEART RATE: 102 BPM | OXYGEN SATURATION: 95 % | HEIGHT: 66 IN | DIASTOLIC BLOOD PRESSURE: 82 MMHG

## 2025-05-12 DIAGNOSIS — L03.115 CELLULITIS OF RIGHT LEG: Primary | ICD-10-CM

## 2025-05-12 DIAGNOSIS — J02.9 VIRAL PHARYNGITIS: ICD-10-CM

## 2025-05-12 PROCEDURE — 99283 EMERGENCY DEPT VISIT LOW MDM: CPT

## 2025-05-12 PROCEDURE — 25000003 PHARM REV CODE 250: Performed by: NURSE PRACTITIONER

## 2025-05-12 RX ORDER — CEFDINIR 300 MG/1
300 CAPSULE ORAL 2 TIMES DAILY
Qty: 20 CAPSULE | Refills: 0 | Status: SHIPPED | OUTPATIENT
Start: 2025-05-12 | End: 2025-05-22

## 2025-05-12 RX ORDER — ACETAMINOPHEN 325 MG/1
650 TABLET ORAL
Status: COMPLETED | OUTPATIENT
Start: 2025-05-12 | End: 2025-05-12

## 2025-05-12 RX ADMIN — ACETAMINOPHEN 650 MG: 325 TABLET ORAL at 09:05

## 2025-05-29 ENCOUNTER — HOSPITAL ENCOUNTER (OUTPATIENT)
Dept: RADIOLOGY | Facility: HOSPITAL | Age: 60
Discharge: HOME | End: 2025-05-29
Attending: ORTHOPAEDIC SURGERY
Payer: MEDICARE

## 2025-05-29 DIAGNOSIS — R60.0 LOCALIZED EDEMA: ICD-10-CM

## 2025-05-29 DIAGNOSIS — R60.0 LOCALIZED EDEMA: Primary | ICD-10-CM

## 2025-05-29 PROCEDURE — 93971 EXTREMITY STUDY: CPT | Mod: TC,RT
